# Patient Record
Sex: FEMALE | Race: WHITE | Employment: UNEMPLOYED | URBAN - METROPOLITAN AREA
[De-identification: names, ages, dates, MRNs, and addresses within clinical notes are randomized per-mention and may not be internally consistent; named-entity substitution may affect disease eponyms.]

---

## 2017-05-11 ENCOUNTER — HOSPITAL ENCOUNTER (OUTPATIENT)
Age: 62
Setting detail: SPECIMEN
Discharge: HOME | End: 2017-05-11
Payer: MEDICARE

## 2017-05-11 LAB
HCT VFR BLD CALC: 37.3 % (ref 36–46)
HEMOGLOBIN: 12 G/DL (ref 12–16)
MCH RBC QN AUTO: 25.2 PG (ref 26–34)
MCHC RBC AUTO-ENTMCNC: 32.1 G/DL (ref 31–37)
MCV RBC AUTO: 78.3 FL (ref 80–100)
PDW BLD-RTO: 15.8 % (ref 11.5–14.5)
PLATELET # BLD: 186 K/UL (ref 130–400)
PMV BLD AUTO: 8.4 FL (ref 6–12)
RBC # BLD: 4.76 M/UL (ref 4–5.2)
WBC # BLD: 8.8 K/UL (ref 3.5–11)

## 2017-05-11 PROCEDURE — 85027 COMPLETE CBC AUTOMATED: CPT

## 2017-05-11 PROCEDURE — 36415 COLL VENOUS BLD VENIPUNCTURE: CPT

## 2017-05-11 PROCEDURE — P9603 ONE-WAY ALLOW PRORATED MILES: HCPCS

## 2017-09-01 ENCOUNTER — HOSPITAL ENCOUNTER (OUTPATIENT)
Age: 62
Setting detail: SPECIMEN
Discharge: HOME | End: 2017-09-01
Payer: COMMERCIAL

## 2017-09-01 LAB
ANION GAP SERPL CALCULATED.3IONS-SCNC: 11 MMOL/L (ref 9–17)
BUN BLDV-MCNC: 17 MG/DL (ref 8–23)
BUN/CREAT BLD: 28 (ref 9–20)
CALCIUM SERPL-MCNC: 9.5 MG/DL (ref 8.6–10.4)
CHLORIDE BLD-SCNC: 99 MMOL/L (ref 98–107)
CO2: 30 MMOL/L (ref 20–31)
CREAT SERPL-MCNC: 0.61 MG/DL (ref 0.5–0.9)
GFR AFRICAN AMERICAN: >60 ML/MIN
GFR NON-AFRICAN AMERICAN: >60 ML/MIN
GFR SERPL CREATININE-BSD FRML MDRD: ABNORMAL ML/MIN/{1.73_M2}
GFR SERPL CREATININE-BSD FRML MDRD: ABNORMAL ML/MIN/{1.73_M2}
GLUCOSE BLD-MCNC: 85 MG/DL (ref 70–99)
POTASSIUM SERPL-SCNC: 3.9 MMOL/L (ref 3.7–5.3)
SODIUM BLD-SCNC: 140 MMOL/L (ref 135–144)

## 2017-09-01 PROCEDURE — 36415 COLL VENOUS BLD VENIPUNCTURE: CPT

## 2017-09-01 PROCEDURE — P9603 ONE-WAY ALLOW PRORATED MILES: HCPCS

## 2017-09-01 PROCEDURE — 80048 BASIC METABOLIC PNL TOTAL CA: CPT

## 2017-09-13 ENCOUNTER — HOSPITAL ENCOUNTER (OUTPATIENT)
Age: 62
Setting detail: SPECIMEN
Discharge: HOME | End: 2017-09-13
Payer: COMMERCIAL

## 2017-09-13 LAB
ALBUMIN SERPL-MCNC: 3.9 G/DL (ref 3.5–5.2)
ALBUMIN/GLOBULIN RATIO: ABNORMAL (ref 1–2.5)
ALP BLD-CCNC: 114 U/L (ref 35–104)
ALT SERPL-CCNC: 16 U/L (ref 5–33)
AST SERPL-CCNC: 18 U/L
BILIRUB SERPL-MCNC: 0.27 MG/DL (ref 0.3–1.2)
BILIRUBIN DIRECT: <0.08 MG/DL
BILIRUBIN, INDIRECT: ABNORMAL MG/DL (ref 0–1)
CHOLESTEROL/HDL RATIO: 4.2
CHOLESTEROL: 175 MG/DL
GLOBULIN: ABNORMAL G/DL (ref 1.5–3.8)
HDLC SERPL-MCNC: 42 MG/DL
LDL CHOLESTEROL: 107 MG/DL (ref 0–130)
TOTAL PROTEIN: 7.4 G/DL (ref 6.4–8.3)
TRIGL SERPL-MCNC: 131 MG/DL
VLDLC SERPL CALC-MCNC: NORMAL MG/DL (ref 1–30)

## 2017-09-13 PROCEDURE — 80076 HEPATIC FUNCTION PANEL: CPT

## 2017-09-13 PROCEDURE — 36415 COLL VENOUS BLD VENIPUNCTURE: CPT

## 2017-09-13 PROCEDURE — 80061 LIPID PANEL: CPT

## 2017-09-13 PROCEDURE — P9603 ONE-WAY ALLOW PRORATED MILES: HCPCS

## 2017-10-12 ENCOUNTER — HOSPITAL ENCOUNTER (OUTPATIENT)
Age: 62
Setting detail: SPECIMEN
Discharge: HOME | End: 2017-10-12
Payer: COMMERCIAL

## 2017-10-12 LAB
T3 FREE: 2.65 PG/ML (ref 2.02–4.43)
THYROXINE, FREE: 0.85 NG/DL (ref 0.93–1.7)
TSH SERPL DL<=0.05 MIU/L-ACNC: 2.91 MIU/L (ref 0.3–5)

## 2017-10-12 PROCEDURE — 84439 ASSAY OF FREE THYROXINE: CPT

## 2017-10-12 PROCEDURE — P9603 ONE-WAY ALLOW PRORATED MILES: HCPCS

## 2017-10-12 PROCEDURE — 36415 COLL VENOUS BLD VENIPUNCTURE: CPT

## 2017-10-12 PROCEDURE — 84443 ASSAY THYROID STIM HORMONE: CPT

## 2017-10-12 PROCEDURE — 84481 FREE ASSAY (FT-3): CPT

## 2017-11-10 ENCOUNTER — HOSPITAL ENCOUNTER (OUTPATIENT)
Age: 62
Setting detail: SPECIMEN
Discharge: HOME | End: 2017-11-10
Payer: COMMERCIAL

## 2017-11-10 LAB
HCT VFR BLD CALC: 37.1 % (ref 36–46)
HEMOGLOBIN: 12 G/DL (ref 12–16)
MCH RBC QN AUTO: 25.2 PG (ref 26–34)
MCHC RBC AUTO-ENTMCNC: 32.5 G/DL (ref 31–37)
MCV RBC AUTO: 77.7 FL (ref 80–100)
PDW BLD-RTO: 17 % (ref 11.5–14.5)
PLATELET # BLD: 165 K/UL (ref 130–400)
PMV BLD AUTO: 9.6 FL (ref 6–12)
RBC # BLD: 4.77 M/UL (ref 4–5.2)
WBC # BLD: 8.5 K/UL (ref 3.5–11)

## 2017-11-10 PROCEDURE — P9603 ONE-WAY ALLOW PRORATED MILES: HCPCS

## 2017-11-10 PROCEDURE — 85027 COMPLETE CBC AUTOMATED: CPT

## 2017-11-10 PROCEDURE — 36415 COLL VENOUS BLD VENIPUNCTURE: CPT

## 2017-12-07 ENCOUNTER — HOSPITAL ENCOUNTER (OUTPATIENT)
Age: 62
Setting detail: SPECIMEN
Discharge: HOME | End: 2017-12-07
Payer: COMMERCIAL

## 2017-12-08 ENCOUNTER — HOSPITAL ENCOUNTER (OUTPATIENT)
Age: 62
Setting detail: SPECIMEN
Discharge: HOME | End: 2017-12-08
Payer: COMMERCIAL

## 2017-12-08 LAB
ALBUMIN SERPL-MCNC: 3.8 G/DL (ref 3.5–5.2)
ALBUMIN/GLOBULIN RATIO: 1.1 (ref 1–2.5)
ALP BLD-CCNC: 102 U/L (ref 35–104)
ALT SERPL-CCNC: 17 U/L (ref 5–33)
ANION GAP SERPL CALCULATED.3IONS-SCNC: 9 MMOL/L (ref 9–17)
AST SERPL-CCNC: 18 U/L
BILIRUB SERPL-MCNC: 0.23 MG/DL (ref 0.3–1.2)
BNP INTERPRETATION: NORMAL
BUN BLDV-MCNC: 19 MG/DL (ref 8–23)
BUN/CREAT BLD: ABNORMAL (ref 9–20)
CALCIUM SERPL-MCNC: 9.1 MG/DL (ref 8.6–10.4)
CHLORIDE BLD-SCNC: 97 MMOL/L (ref 98–107)
CO2: 33 MMOL/L (ref 20–31)
CORTISOL COLLECTION INFO: NORMAL
CORTISOL: 3.6 UG/DL (ref 2.7–18.4)
CREAT SERPL-MCNC: 0.57 MG/DL (ref 0.5–0.9)
ESTIMATED AVERAGE GLUCOSE: 105 MG/DL
GFR AFRICAN AMERICAN: >60 ML/MIN
GFR NON-AFRICAN AMERICAN: >60 ML/MIN
GFR SERPL CREATININE-BSD FRML MDRD: ABNORMAL ML/MIN/{1.73_M2}
GFR SERPL CREATININE-BSD FRML MDRD: ABNORMAL ML/MIN/{1.73_M2}
GLUCOSE BLD-MCNC: 108 MG/DL (ref 70–99)
HBA1C MFR BLD: 5.3 % (ref 4–6)
POTASSIUM SERPL-SCNC: 4.5 MMOL/L (ref 3.7–5.3)
PRO-BNP: 89 PG/ML
SODIUM BLD-SCNC: 139 MMOL/L (ref 135–144)
TOTAL PROTEIN: 7.2 G/DL (ref 6.4–8.3)

## 2017-12-08 PROCEDURE — P9603 ONE-WAY ALLOW PRORATED MILES: HCPCS

## 2017-12-08 PROCEDURE — 83036 HEMOGLOBIN GLYCOSYLATED A1C: CPT

## 2017-12-08 PROCEDURE — 36415 COLL VENOUS BLD VENIPUNCTURE: CPT

## 2017-12-08 PROCEDURE — 80053 COMPREHEN METABOLIC PANEL: CPT

## 2017-12-08 PROCEDURE — 83880 ASSAY OF NATRIURETIC PEPTIDE: CPT

## 2017-12-08 PROCEDURE — 82533 TOTAL CORTISOL: CPT

## 2018-01-08 ENCOUNTER — APPOINTMENT (OUTPATIENT)
Dept: CT IMAGING | Age: 63
DRG: 208 | End: 2018-01-08
Payer: COMMERCIAL

## 2018-01-08 ENCOUNTER — APPOINTMENT (OUTPATIENT)
Dept: GENERAL RADIOLOGY | Age: 63
DRG: 208 | End: 2018-01-08
Payer: COMMERCIAL

## 2018-01-08 ENCOUNTER — HOSPITAL ENCOUNTER (INPATIENT)
Age: 63
LOS: 4 days | Discharge: NURSING, REHAB OR LONG-TERM CARE | DRG: 208 | End: 2018-01-12
Attending: EMERGENCY MEDICINE | Admitting: INTERNAL MEDICINE
Payer: COMMERCIAL

## 2018-01-08 DIAGNOSIS — R09.02 HYPOXEMIA: ICD-10-CM

## 2018-01-08 DIAGNOSIS — R06.89 HYPERCAPNEMIA: ICD-10-CM

## 2018-01-08 DIAGNOSIS — J95.09 TRACHEOSTOMY OBSTRUCTION (HCC): Primary | ICD-10-CM

## 2018-01-08 DIAGNOSIS — J18.9 PNEUMONIA DUE TO ORGANISM: ICD-10-CM

## 2018-01-08 DIAGNOSIS — R06.82 TACHYPNEA: ICD-10-CM

## 2018-01-08 LAB
ABSOLUTE EOS #: 0.11 K/UL (ref 0–0.4)
ABSOLUTE IMMATURE GRANULOCYTE: ABNORMAL K/UL (ref 0–0.3)
ABSOLUTE LYMPH #: 1.25 K/UL (ref 1–4.8)
ABSOLUTE MONO #: 0.57 K/UL (ref 0.1–1.3)
ALLEN TEST: ABNORMAL
ANION GAP SERPL CALCULATED.3IONS-SCNC: 9 MMOL/L (ref 9–17)
BASOPHILS # BLD: 1 % (ref 0–2)
BASOPHILS ABSOLUTE: 0.11 K/UL (ref 0–0.2)
BNP INTERPRETATION: NORMAL
BUN BLDV-MCNC: 14 MG/DL (ref 8–23)
BUN/CREAT BLD: ABNORMAL (ref 9–20)
CALCIUM SERPL-MCNC: 9.7 MG/DL (ref 8.6–10.4)
CARBOXYHEMOGLOBIN: 1.4 % (ref 0–5)
CHLORIDE BLD-SCNC: 96 MMOL/L (ref 98–107)
CO2: 38 MMOL/L (ref 20–31)
CREAT SERPL-MCNC: 0.5 MG/DL (ref 0.5–0.9)
DIFFERENTIAL TYPE: ABNORMAL
DIRECT EXAM: NORMAL
EKG ATRIAL RATE: 124 BPM
EKG P AXIS: 59 DEGREES
EKG P-R INTERVAL: 156 MS
EKG Q-T INTERVAL: 308 MS
EKG QRS DURATION: 80 MS
EKG QTC CALCULATION (BAZETT): 442 MS
EKG R AXIS: 13 DEGREES
EKG T AXIS: 133 DEGREES
EKG VENTRICULAR RATE: 124 BPM
EOSINOPHILS RELATIVE PERCENT: 1 % (ref 0–4)
FIO2: ABNORMAL
GFR AFRICAN AMERICAN: >60 ML/MIN
GFR NON-AFRICAN AMERICAN: >60 ML/MIN
GFR SERPL CREATININE-BSD FRML MDRD: ABNORMAL ML/MIN/{1.73_M2}
GFR SERPL CREATININE-BSD FRML MDRD: ABNORMAL ML/MIN/{1.73_M2}
GLUCOSE BLD-MCNC: 138 MG/DL (ref 70–99)
HCO3 ARTERIAL: 42.5 MMOL/L (ref 22–26)
HCT VFR BLD CALC: 38.6 % (ref 36–46)
HEMOGLOBIN: 12.5 G/DL (ref 12–16)
IMMATURE GRANULOCYTES: ABNORMAL %
LACTIC ACID: 0.7 MMOL/L (ref 0.5–2.2)
LYMPHOCYTES # BLD: 11 % (ref 24–44)
Lab: NORMAL
MCH RBC QN AUTO: 25.2 PG (ref 26–34)
MCHC RBC AUTO-ENTMCNC: 32.2 G/DL (ref 31–37)
MCV RBC AUTO: 78.4 FL (ref 80–100)
METHEMOGLOBIN: 0.4 % (ref 0–1.9)
MODE: ABNORMAL
MONOCYTES # BLD: 5 % (ref 1–7)
MORPHOLOGY: ABNORMAL
NEGATIVE BASE EXCESS, ART: ABNORMAL MMOL/L (ref 0–2)
NOTIFICATION TIME: ABNORMAL
NOTIFICATION: ABNORMAL
O2 DEVICE/FLOW/%: ABNORMAL
O2 SAT, ARTERIAL: 85.2 % (ref 95–98)
OXYHEMOGLOBIN: ABNORMAL % (ref 95–98)
PATIENT TEMP: 37
PCO2 ARTERIAL: 87.5 MMHG (ref 35–45)
PCO2, ART, TEMP ADJ: ABNORMAL (ref 35–45)
PDW BLD-RTO: 16.5 % (ref 11.5–14.9)
PEEP/CPAP: ABNORMAL
PH ARTERIAL: 7.29 (ref 7.35–7.45)
PH, ART, TEMP ADJ: ABNORMAL (ref 7.35–7.45)
PLATELET # BLD: 156 K/UL (ref 150–450)
PLATELET ESTIMATE: ABNORMAL
PMV BLD AUTO: 8.6 FL (ref 6–12)
PO2 ARTERIAL: 54.7 MMHG (ref 80–100)
PO2, ART, TEMP ADJ: ABNORMAL MMHG (ref 80–100)
POSITIVE BASE EXCESS, ART: 16 MMOL/L (ref 0–2)
POTASSIUM SERPL-SCNC: 4.3 MMOL/L (ref 3.7–5.3)
PRO-BNP: 276 PG/ML
PSV: ABNORMAL
PT. POSITION: ABNORMAL
RBC # BLD: 4.93 M/UL (ref 4–5.2)
RBC # BLD: ABNORMAL 10*6/UL
RESPIRATORY RATE: 22
SAMPLE SITE: ABNORMAL
SEG NEUTROPHILS: 82 % (ref 36–66)
SEGMENTED NEUTROPHILS ABSOLUTE COUNT: 9.36 K/UL (ref 1.3–9.1)
SET RATE: ABNORMAL
SODIUM BLD-SCNC: 143 MMOL/L (ref 135–144)
SPECIMEN DESCRIPTION: NORMAL
SPECIMEN DESCRIPTION: NORMAL
STATUS: NORMAL
TEXT FOR RESPIRATORY: ABNORMAL
TOTAL HB: ABNORMAL G/DL (ref 12–16)
TOTAL RATE: ABNORMAL
TROPONIN INTERP: NORMAL
TROPONIN INTERP: NORMAL
TROPONIN T: <0.03 NG/ML
TROPONIN T: <0.03 NG/ML
VT: ABNORMAL
WBC # BLD: 11.4 K/UL (ref 3.5–11)
WBC # BLD: ABNORMAL 10*3/UL

## 2018-01-08 PROCEDURE — 6370000000 HC RX 637 (ALT 250 FOR IP): Performed by: EMERGENCY MEDICINE

## 2018-01-08 PROCEDURE — 82805 BLOOD GASES W/O2 SATURATION: CPT

## 2018-01-08 PROCEDURE — 6360000002 HC RX W HCPCS: Performed by: EMERGENCY MEDICINE

## 2018-01-08 PROCEDURE — 94762 N-INVAS EAR/PLS OXIMTRY CONT: CPT

## 2018-01-08 PROCEDURE — 31502 CHANGE OF WINDPIPE AIRWAY: CPT

## 2018-01-08 PROCEDURE — 86738 MYCOPLASMA ANTIBODY: CPT

## 2018-01-08 PROCEDURE — 2580000003 HC RX 258: Performed by: STUDENT IN AN ORGANIZED HEALTH CARE EDUCATION/TRAINING PROGRAM

## 2018-01-08 PROCEDURE — 31720 CLEARANCE OF AIRWAYS: CPT

## 2018-01-08 PROCEDURE — 36415 COLL VENOUS BLD VENIPUNCTURE: CPT

## 2018-01-08 PROCEDURE — 83880 ASSAY OF NATRIURETIC PEPTIDE: CPT

## 2018-01-08 PROCEDURE — 94002 VENT MGMT INPAT INIT DAY: CPT

## 2018-01-08 PROCEDURE — 2000000000 HC ICU R&B

## 2018-01-08 PROCEDURE — 36600 WITHDRAWAL OF ARTERIAL BLOOD: CPT

## 2018-01-08 PROCEDURE — 99223 1ST HOSP IP/OBS HIGH 75: CPT | Performed by: INTERNAL MEDICINE

## 2018-01-08 PROCEDURE — 85025 COMPLETE CBC W/AUTO DIFF WBC: CPT

## 2018-01-08 PROCEDURE — 2580000003 HC RX 258: Performed by: EMERGENCY MEDICINE

## 2018-01-08 PROCEDURE — 87804 INFLUENZA ASSAY W/OPTIC: CPT

## 2018-01-08 PROCEDURE — 94664 DEMO&/EVAL PT USE INHALER: CPT

## 2018-01-08 PROCEDURE — 84484 ASSAY OF TROPONIN QUANT: CPT

## 2018-01-08 PROCEDURE — 87040 BLOOD CULTURE FOR BACTERIA: CPT

## 2018-01-08 PROCEDURE — 99285 EMERGENCY DEPT VISIT HI MDM: CPT

## 2018-01-08 PROCEDURE — 71045 X-RAY EXAM CHEST 1 VIEW: CPT

## 2018-01-08 PROCEDURE — 94640 AIRWAY INHALATION TREATMENT: CPT

## 2018-01-08 PROCEDURE — 6360000004 HC RX CONTRAST MEDICATION: Performed by: EMERGENCY MEDICINE

## 2018-01-08 PROCEDURE — 5A1935Z RESPIRATORY VENTILATION, LESS THAN 24 CONSECUTIVE HOURS: ICD-10-PCS | Performed by: INTERNAL MEDICINE

## 2018-01-08 PROCEDURE — 2060000000 HC ICU INTERMEDIATE R&B

## 2018-01-08 PROCEDURE — 83605 ASSAY OF LACTIC ACID: CPT

## 2018-01-08 PROCEDURE — 80048 BASIC METABOLIC PNL TOTAL CA: CPT

## 2018-01-08 PROCEDURE — 93005 ELECTROCARDIOGRAM TRACING: CPT

## 2018-01-08 RX ORDER — SODIUM CHLORIDE 0.9 % (FLUSH) 0.9 %
10 SYRINGE (ML) INJECTION PRN
Status: DISCONTINUED | OUTPATIENT
Start: 2018-01-08 | End: 2018-01-09

## 2018-01-08 RX ORDER — ALBUTEROL SULFATE 2.5 MG/3ML
2.5 SOLUTION RESPIRATORY (INHALATION)
Status: DISCONTINUED | OUTPATIENT
Start: 2018-01-08 | End: 2018-01-12 | Stop reason: HOSPADM

## 2018-01-08 RX ORDER — IPRATROPIUM BROMIDE AND ALBUTEROL SULFATE 2.5; .5 MG/3ML; MG/3ML
1 SOLUTION RESPIRATORY (INHALATION) ONCE
Status: COMPLETED | OUTPATIENT
Start: 2018-01-08 | End: 2018-01-08

## 2018-01-08 RX ORDER — METHYLPREDNISOLONE SODIUM SUCCINATE 125 MG/2ML
125 INJECTION, POWDER, LYOPHILIZED, FOR SOLUTION INTRAMUSCULAR; INTRAVENOUS ONCE
Status: COMPLETED | OUTPATIENT
Start: 2018-01-08 | End: 2018-01-08

## 2018-01-08 RX ORDER — ATORVASTATIN CALCIUM 40 MG/1
40 TABLET, FILM COATED ORAL NIGHTLY
Status: DISCONTINUED | OUTPATIENT
Start: 2018-01-08 | End: 2018-01-12 | Stop reason: HOSPADM

## 2018-01-08 RX ORDER — FUROSEMIDE 40 MG/1
40 TABLET ORAL DAILY
Status: DISCONTINUED | OUTPATIENT
Start: 2018-01-09 | End: 2018-01-12 | Stop reason: HOSPADM

## 2018-01-08 RX ORDER — RISPERIDONE 3 MG/1
3 TABLET, FILM COATED ORAL DAILY
Status: DISCONTINUED | OUTPATIENT
Start: 2018-01-09 | End: 2018-01-09

## 2018-01-08 RX ORDER — IPRATROPIUM BROMIDE AND ALBUTEROL SULFATE 2.5; .5 MG/3ML; MG/3ML
1 SOLUTION RESPIRATORY (INHALATION) EVERY 6 HOURS
Status: ON HOLD | COMMUNITY
End: 2018-01-12 | Stop reason: HOSPADM

## 2018-01-08 RX ORDER — NITROGLYCERIN 0.4 MG/1
0.4 TABLET SUBLINGUAL EVERY 5 MIN PRN
COMMUNITY

## 2018-01-08 RX ORDER — GUAIFENESIN AND DEXTROMETHORPHAN HYDROBROMIDE 100; 10 MG/5ML; MG/5ML
5 SOLUTION ORAL EVERY 4 HOURS PRN
COMMUNITY

## 2018-01-08 RX ORDER — LOPERAMIDE HCL 1 MG/7.5ML
2 SUSPENSION ORAL EVERY 6 HOURS PRN
COMMUNITY

## 2018-01-08 RX ORDER — METHYLPREDNISOLONE SODIUM SUCCINATE 125 MG/2ML
80 INJECTION, POWDER, LYOPHILIZED, FOR SOLUTION INTRAMUSCULAR; INTRAVENOUS 3 TIMES DAILY
Status: DISCONTINUED | OUTPATIENT
Start: 2018-01-08 | End: 2018-01-10

## 2018-01-08 RX ORDER — NICOTINE 21 MG/24HR
1 PATCH, TRANSDERMAL 24 HOURS TRANSDERMAL DAILY PRN
Status: DISCONTINUED | OUTPATIENT
Start: 2018-01-08 | End: 2018-01-12 | Stop reason: HOSPADM

## 2018-01-08 RX ORDER — FAMOTIDINE 20 MG/1
20 TABLET, FILM COATED ORAL 2 TIMES DAILY
Status: DISCONTINUED | OUTPATIENT
Start: 2018-01-08 | End: 2018-01-12 | Stop reason: HOSPADM

## 2018-01-08 RX ORDER — BUDESONIDE 0.25 MG/2ML
250 INHALANT ORAL 2 TIMES DAILY
Status: DISCONTINUED | OUTPATIENT
Start: 2018-01-08 | End: 2018-01-12 | Stop reason: HOSPADM

## 2018-01-08 RX ORDER — LORAZEPAM 1 MG/1
1 TABLET ORAL EVERY 6 HOURS PRN
COMMUNITY

## 2018-01-08 RX ORDER — 0.9 % SODIUM CHLORIDE 0.9 %
1000 INTRAVENOUS SOLUTION INTRAVENOUS ONCE
Status: COMPLETED | OUTPATIENT
Start: 2018-01-08 | End: 2018-01-08

## 2018-01-08 RX ORDER — LISINOPRIL 5 MG/1
2.5 TABLET ORAL DAILY
Status: DISCONTINUED | OUTPATIENT
Start: 2018-01-09 | End: 2018-01-12 | Stop reason: HOSPADM

## 2018-01-08 RX ORDER — ACETAMINOPHEN 325 MG/1
650 TABLET ORAL EVERY 4 HOURS PRN
Status: DISCONTINUED | OUTPATIENT
Start: 2018-01-08 | End: 2018-01-12 | Stop reason: HOSPADM

## 2018-01-08 RX ORDER — LEVOFLOXACIN 5 MG/ML
500 INJECTION, SOLUTION INTRAVENOUS EVERY 24 HOURS
Status: DISCONTINUED | OUTPATIENT
Start: 2018-01-09 | End: 2018-01-12

## 2018-01-08 RX ORDER — NYSTATIN 10B UNIT
1 POWDER (EA) MISCELLANEOUS 2 TIMES DAILY
COMMUNITY

## 2018-01-08 RX ORDER — OXYBUTYNIN CHLORIDE 5 MG/1
5 TABLET ORAL DAILY
Status: DISCONTINUED | OUTPATIENT
Start: 2018-01-09 | End: 2018-01-12 | Stop reason: HOSPADM

## 2018-01-08 RX ORDER — CIPROFLOXACIN 2 MG/ML
400 INJECTION, SOLUTION INTRAVENOUS ONCE
Status: COMPLETED | OUTPATIENT
Start: 2018-01-08 | End: 2018-01-08

## 2018-01-08 RX ORDER — IPRATROPIUM BROMIDE AND ALBUTEROL SULFATE 2.5; .5 MG/3ML; MG/3ML
1 SOLUTION RESPIRATORY (INHALATION)
Status: DISCONTINUED | OUTPATIENT
Start: 2018-01-09 | End: 2018-01-12 | Stop reason: HOSPADM

## 2018-01-08 RX ORDER — 0.9 % SODIUM CHLORIDE 0.9 %
100 INTRAVENOUS SOLUTION INTRAVENOUS ONCE
Status: COMPLETED | OUTPATIENT
Start: 2018-01-08 | End: 2018-01-08

## 2018-01-08 RX ORDER — SERTRALINE HYDROCHLORIDE 100 MG/1
150 TABLET, FILM COATED ORAL NIGHTLY
COMMUNITY

## 2018-01-08 RX ORDER — FLUTICASONE PROPIONATE 50 MCG
2 SPRAY, SUSPENSION (ML) NASAL EVERY 12 HOURS
COMMUNITY

## 2018-01-08 RX ORDER — POLYETHYLENE GLYCOL 3350 17 G/17G
17 POWDER, FOR SOLUTION ORAL DAILY PRN
COMMUNITY

## 2018-01-08 RX ORDER — SODIUM CHLORIDE 0.9 % (FLUSH) 0.9 %
10 SYRINGE (ML) INJECTION PRN
Status: DISCONTINUED | OUTPATIENT
Start: 2018-01-08 | End: 2018-01-12 | Stop reason: HOSPADM

## 2018-01-08 RX ORDER — ONDANSETRON 2 MG/ML
4 INJECTION INTRAMUSCULAR; INTRAVENOUS EVERY 6 HOURS PRN
Status: DISCONTINUED | OUTPATIENT
Start: 2018-01-08 | End: 2018-01-12 | Stop reason: HOSPADM

## 2018-01-08 RX ORDER — ACETAMINOPHEN 325 MG/1
650 TABLET ORAL EVERY 4 HOURS PRN
COMMUNITY

## 2018-01-08 RX ORDER — SODIUM CHLORIDE 0.9 % (FLUSH) 0.9 %
10 SYRINGE (ML) INJECTION EVERY 12 HOURS SCHEDULED
Status: DISCONTINUED | OUTPATIENT
Start: 2018-01-08 | End: 2018-01-12 | Stop reason: HOSPADM

## 2018-01-08 RX ORDER — SODIUM CHLORIDE 9 MG/ML
INJECTION, SOLUTION INTRAVENOUS CONTINUOUS
Status: DISCONTINUED | OUTPATIENT
Start: 2018-01-08 | End: 2018-01-12 | Stop reason: HOSPADM

## 2018-01-08 RX ADMIN — METHYLPREDNISOLONE SODIUM SUCCINATE 125 MG: 125 INJECTION, POWDER, FOR SOLUTION INTRAMUSCULAR; INTRAVENOUS at 18:55

## 2018-01-08 RX ADMIN — IOPAMIDOL 100 ML: 755 INJECTION, SOLUTION INTRAVENOUS at 17:16

## 2018-01-08 RX ADMIN — CIPROFLOXACIN 400 MG: 2 INJECTION, SOLUTION INTRAVENOUS at 18:55

## 2018-01-08 RX ADMIN — IPRATROPIUM BROMIDE AND ALBUTEROL SULFATE 1 AMPULE: .5; 3 SOLUTION RESPIRATORY (INHALATION) at 14:50

## 2018-01-08 RX ADMIN — Medication 10 ML: at 17:17

## 2018-01-08 RX ADMIN — SODIUM CHLORIDE 100 ML: 9 INJECTION, SOLUTION INTRAVENOUS at 17:16

## 2018-01-08 RX ADMIN — MEROPENEM 1 G: 1 INJECTION, POWDER, FOR SOLUTION INTRAVENOUS at 18:55

## 2018-01-08 RX ADMIN — VANCOMYCIN HYDROCHLORIDE: 10 INJECTION, POWDER, LYOPHILIZED, FOR SOLUTION INTRAVENOUS at 20:03

## 2018-01-08 RX ADMIN — SODIUM CHLORIDE: 9 INJECTION, SOLUTION INTRAVENOUS at 22:09

## 2018-01-08 RX ADMIN — SODIUM CHLORIDE 1000 ML: 9 INJECTION, SOLUTION INTRAVENOUS at 16:40

## 2018-01-08 ASSESSMENT — PAIN SCALES - GENERAL: PAINLEVEL_OUTOF10: 0

## 2018-01-08 ASSESSMENT — ENCOUNTER SYMPTOMS
SPUTUM PRODUCTION: 1
SHORTNESS OF BREATH: 1
VOMITING: 0
SORE THROAT: 0
BACK PAIN: 0
DOUBLE VISION: 0
COUGH: 1
ABDOMINAL PAIN: 0
WHEEZING: 1
BLURRED VISION: 0
NAUSEA: 0

## 2018-01-08 ASSESSMENT — PULMONARY FUNCTION TESTS: PIF_VALUE: 28

## 2018-01-08 NOTE — ED PROVIDER NOTES
Mouth/Throat: Oropharynx is clear and moist.   Eyes: Conjunctivae and EOM are normal. Pupils are equal, round, and reactive to light. Right eye exhibits no discharge. Left eye exhibits no discharge. Neck: Normal range of motion. Neck supple. No tracheal deviation present. Cardiovascular: Regular rhythm, normal heart sounds and intact distal pulses. Tachycardia present. Exam reveals no gallop and no friction rub. No murmur heard. Pulmonary/Chest: Effort normal and breath sounds normal. No respiratory distress. She has no wheezes. She has no rales. She exhibits no tenderness. Abdominal: Soft. Bowel sounds are normal. She exhibits no distension and no mass. There is no tenderness. There is no rebound and no guarding. Musculoskeletal: Normal range of motion. She exhibits no edema or tenderness. Neurological: She is alert and oriented to person, place, and time. She has normal reflexes. No cranial nerve deficit. Skin: No rash noted. She is not diaphoretic. Psychiatric: She has a normal mood and affect. Her behavior is normal. Thought content normal.       DIAGNOSTIC RESULTS     EKG: All EKG's are interpreted by the Emergency Department Physician who either signs or Co-signs this chart in the absence of a cardiologist.    EKG Interpretation    Interpreted by emergency department physician    Rhythm: sinus tachycardia  Rate: tachycardia, 124  Axis: normal  Ectopy: none  Conduction: normal  ST Segments: no acute change  T Waves: no acute change  Q Waves: nonspecific    EKG  Impression: non-specific EKG and sinus tachycardia Similar morphology to 09-DEC-2013 07:30:45        RADIOLOGY:   I directly visualized the following  images and reviewed the radiologist interpretations:  XR CHEST PORTABLE   Final Result   Tracheostomy tube has been changed as per history. No change in bilateral   patchy infiltrates.          XR CHEST PORTABLE   Final Result   Patchy airspace and interstitial infiltrates with some pneumonia on chest x-ray versus more chronic nodular opacities, recommending CT, we will order a CT scan. Patient has borderline elevated white cell count, Negative lactic acid, low suspicion for septic shock as blood pressure has been stable,    Notified by CT scan of the patient is not tolerating lying down for Scan. Low suspicion for pulmonary embolism, and due to evidence of infection we'll treat for healthcare acquired pneumonia with complaint of back therapy vancomycin, meropenem, ciprofloxacin as patient has an allergy. Decision to admit. Discussed case with Dr. Nneka Colin who has accepted admission. Request residents be paged and also consult to pulmonology. Discussed case with Dr. Rafat Anguiano who is aware patient's presenting symptoms, concern for pneumonia, He requests ABG. Patient's ABG shows hypercapnia as well as hypoxia. Called into room at approximately 1850 saturation patient, she is hypoxic she looked cyanotic she is struggling to breathe, the inner cannula was replaced and patient  Responded to being bagged, breath sounds were checked and they were weak bilaterally, no tracheal deviation low suspicion for tension pneumothorax. Patient improves with Bagging however was very difficult, raising concern for Trach obstruction. Oxygen saturations ranged in the 70s and 80s. Anesthesia paged for additional assistance but in the OR right now unavailable, the fiberoptics scope was utilized, Without clear evidence of obstruction. Patient continued to desaturate and struggled, respiratory therapy at bedside along with Dr. Kaden Odom, we believe the best next course of action due to her decompensating condition is to exchange the trach.   This was done at bedside with the advisement of respiratory therapy, a similar coughed trach was utilized, the previous one was removed and the new one inserted by this examiner, which was performed without resistance, and patient was Again bagged without

## 2018-01-08 NOTE — PROGRESS NOTES
· Bronchodilator assessment   [x]    Bronchodilator Assessment        Bronchodilator assessment at level  2  BRONCHODILATOR ASSESSMENT SCORE  Score 1 2 3 4   Breath Sounds   []  Clear [x]  Mild Wheezing with good aeration []  Moderate I/E wheezing with adequate aeration []  Poor Aeration or diffuse wheezing   Respiratory Rate []  Less than 20 [x]  20-25 []  Greater than 25  []  Greater than 35    Dyspnea [x]  No SOB  [x]  SOB with minimal activity []  Speaking in partial sentences []  Acute/ At rest   Peakflow (asthma) []  80 % or greater predicted/PB  []  Unable []  70% or greater predicted/PB  []  Unable []  51%-70% predicted/PB  []  Unable []  Less than 50% predicted/PB  []  Unable due to distress   FEV1 % Predicted []  Greater than 69%  []  Unable  []  Less than 50%-69%  []  Unable  []  Less than 35%-49%  []  Unable  []  Less than 35%  []  Unable due to distress

## 2018-01-08 NOTE — H&P
Medication Sig Start Date End Date Taking? Authorizing Provider   albuterol (PROVENTIL HFA;VENTOLIN HFA) 108 (90 BASE) MCG/ACT inhaler Inhale 4 puffs into the lungs every 4 hours as needed for Wheezing. 12/23/13   Joyce Montes MD   atorvastatin (LIPITOR) 40 MG tablet 1 tablet by Per NG tube route nightly. 12/23/13   Joyce Montes MD   risperiDONE (RISPERDAL) 3 MG tablet 1 tablet by Per NG tube route daily. 12/23/13   Joyce Montes MD   lisinopril (PRINIVIL;ZESTRIL) 2.5 MG tablet 1 tablet by Per NG tube route daily. 12/23/13   Joyce Montes MD   nystatin (MYCOSTATIN) powder Apply to affected areas 12/23/13   Joyce Montes MD   furosemide (LASIX) 40 MG tablet 1 tablet by Per NG tube route daily. 12/23/13   Joyce Montes MD   oxybutynin (DITROPAN) 5 MG tablet 1 tablet by PEG Tube route daily. 12/23/13   Joyce Montes MD   famotidine (PEPCID) 20 MG tablet 1 tablet by PEG Tube route 2 times daily. 12/23/13   Joyce Montes MD   metoprolol (LOPRESSOR) 25 MG tablet 1 tablet by PEG Tube route 2 times daily. 12/23/13   Joyce Montes MD        Allergies:     Mobic [meloxicam] and Pcn [penicillins]    Social History:     Tobacco:    has no tobacco history on file. Alcohol:      has no alcohol history on file. Drug Use:  has no drug history on file. Family History:     History reviewed. No pertinent family history. Review of Systems:     Positive and Negative as described in HPI. Review of Systems   Constitutional: Negative for chills and fever. HENT: Negative for congestion and sore throat. Eyes: Negative for blurred vision and double vision. Respiratory: Positive for cough, sputum production, shortness of breath and wheezing. Cardiovascular: Positive for chest pain. Negative for leg swelling. Gastrointestinal: Negative for abdominal pain, nausea and vomiting. Genitourinary: Negative for dysuria, frequency and urgency. Musculoskeletal: Negative for back pain, myalgias and neck pain.    Neurological: Positive for headaches. Negative for dizziness, tingling and focal weakness. Physical Exam:   BP (!) 147/72   Pulse 108   Temp 98.1 °F (36.7 °C) (Oral)   Resp 24   Ht 5' 2\" (1.575 m)   Wt 290 lb (131.5 kg)   SpO2 93%   BMI 53.04 kg/m²   Temp (24hrs), Av.1 °F (36.7 °C), Min:98.1 °F (36.7 °C), Max:98.1 °F (36.7 °C)    No results for input(s): POCGLU in the last 72 hours. No intake or output data in the 24 hours ending 18 1853    Physical Exam   Constitutional: She is oriented to person, place, and time. She appears distressed. HENT:   Head: Normocephalic and atraumatic. Eyes: EOM are normal. Pupils are equal, round, and reactive to light. Neck: Normal range of motion. Neck supple. Cardiovascular: Normal rate, regular rhythm and normal heart sounds. Exam reveals no gallop and no friction rub. No murmur heard. Pulmonary/Chest: She is in respiratory distress. She has wheezes. She has no rales. She exhibits no tenderness. Abdominal: Soft. Bowel sounds are normal. She exhibits no distension. There is tenderness. There is no rebound and no guarding. Umbilical hernia    Musculoskeletal: She exhibits no tenderness or deformity. Neurological: She is alert and oriented to person, place, and time. Skin: Skin is warm and dry.      Investigations:      Laboratory Testing:  Recent Results (from the past 24 hour(s))   CBC Auto Differential    Collection Time: 18  3:59 PM   Result Value Ref Range    WBC 11.4 (H) 3.5 - 11.0 k/uL    RBC 4.93 4.0 - 5.2 m/uL    Hemoglobin 12.5 12.0 - 16.0 g/dL    Hematocrit 38.6 36 - 46 %    MCV 78.4 (L) 80 - 100 fL    MCH 25.2 (L) 26 - 34 pg    MCHC 32.2 31 - 37 g/dL    RDW 16.5 (H) 11.5 - 14.9 %    Platelets 126 952 - 872 k/uL    MPV 8.6 6.0 - 12.0 fL    Differential Type NOT REPORTED     Immature Granulocytes NOT REPORTED 0 %    Absolute Immature Granulocyte NOT REPORTED 0.00 - 0.30 k/uL    WBC Morphology NOT REPORTED     RBC Morphology NOT REPORTED     Platelet Estimate NOT REPORTED     Seg Neutrophils 82 (H) 36 - 66 %    Lymphocytes 11 (L) 24 - 44 %    Monocytes 5 1 - 7 %    Eosinophils % 1 0 - 4 %    Basophils 1 0 - 2 %    Segs Absolute 9.36 (H) 1.3 - 9.1 k/uL    Absolute Lymph # 1.25 1.0 - 4.8 k/uL    Absolute Mono # 0.57 0.1 - 1.3 k/uL    Absolute Eos # 0.11 0.0 - 0.4 k/uL    Basophils # 0.11 0.0 - 0.2 k/uL    Morphology ANISOCYTOSIS PRESENT    Basic Metabolic Panel    Collection Time: 01/08/18  3:59 PM   Result Value Ref Range    Glucose 138 (H) 70 - 99 mg/dL    BUN 14 8 - 23 mg/dL    CREATININE 0.50 0.50 - 0.90 mg/dL    Bun/Cre Ratio NOT REPORTED 9 - 20    Calcium 9.7 8.6 - 10.4 mg/dL    Sodium 143 135 - 144 mmol/L    Potassium 4.3 3.7 - 5.3 mmol/L    Chloride 96 (L) 98 - 107 mmol/L    CO2 38 (H) 20 - 31 mmol/L    Anion Gap 9 9 - 17 mmol/L    GFR Non-African American >60 >60 mL/min    GFR African American >60 >60 mL/min    GFR Comment          GFR Staging NOT REPORTED    Lactic Acid    Collection Time: 01/08/18  3:59 PM   Result Value Ref Range    Lactic Acid 0.7 0.5 - 2.2 mmol/L   Troponin    Collection Time: 01/08/18  3:59 PM   Result Value Ref Range    Troponin T <0.03 <0.03 ng/mL    Troponin Interp         Brain Natriuretic Peptide    Collection Time: 01/08/18  3:59 PM   Result Value Ref Range    Pro- <300 pg/mL    BNP Interpretation         Rapid influenza A/B antigens    Collection Time: 01/08/18  4:03 PM   Result Value Ref Range    Specimen Description       . NARES Performed at [facility]: IBETH BRUNER [address] [place] [address]    Specimen Description  [id number] [phone]     Special Requests NOT REPORTED     Direct Exam PRESUMPTIVE NEGATIVE for Influenza A + B antigens. Direct Exam       PCR testing to confirm this result is available upon request.  Specimen will be    Direct Exam        saved in the laboratory for 7 days. Please call [phone] if PCR testing is    Direct Exam  indicated.      Status FINAL 01/08/2018    EKG 12 Lead

## 2018-01-09 ENCOUNTER — APPOINTMENT (OUTPATIENT)
Dept: GENERAL RADIOLOGY | Age: 63
DRG: 208 | End: 2018-01-09
Payer: COMMERCIAL

## 2018-01-09 LAB
ALBUMIN SERPL-MCNC: 3.8 G/DL (ref 3.5–5.2)
ALBUMIN/GLOBULIN RATIO: ABNORMAL (ref 1–2.5)
ALLEN TEST: ABNORMAL
ALLEN TEST: ABNORMAL
ALP BLD-CCNC: 76 U/L (ref 35–104)
ALT SERPL-CCNC: 15 U/L (ref 5–33)
ANION GAP SERPL CALCULATED.3IONS-SCNC: 11 MMOL/L (ref 9–17)
AST SERPL-CCNC: 16 U/L
BILIRUB SERPL-MCNC: 0.37 MG/DL (ref 0.3–1.2)
BUN BLDV-MCNC: 15 MG/DL (ref 8–23)
BUN/CREAT BLD: ABNORMAL (ref 9–20)
CALCIUM SERPL-MCNC: 9.5 MG/DL (ref 8.6–10.4)
CARBOXYHEMOGLOBIN: 0.7 %
CARBOXYHEMOGLOBIN: 1 %
CHLORIDE BLD-SCNC: 97 MMOL/L (ref 98–107)
CO2: 34 MMOL/L (ref 20–31)
CREAT SERPL-MCNC: 0.51 MG/DL (ref 0.5–0.9)
D-DIMER QUANTITATIVE: 0.21 MG/L FEU
FIO2: 60
FIO2: ABNORMAL
GFR AFRICAN AMERICAN: >60 ML/MIN
GFR NON-AFRICAN AMERICAN: >60 ML/MIN
GFR SERPL CREATININE-BSD FRML MDRD: ABNORMAL ML/MIN/{1.73_M2}
GFR SERPL CREATININE-BSD FRML MDRD: ABNORMAL ML/MIN/{1.73_M2}
GLUCOSE BLD-MCNC: 160 MG/DL (ref 70–99)
HCO3 ARTERIAL: 37.7 MMOL/L
HCO3 ARTERIAL: 39.1 MMOL/L
HCT VFR BLD CALC: 39.1 % (ref 36–46)
HEMOGLOBIN: 12.2 G/DL (ref 12–16)
LV EF: 55 %
LVEF MODALITY: NORMAL
MCH RBC QN AUTO: 24.7 PG (ref 26–34)
MCHC RBC AUTO-ENTMCNC: 31.1 G/DL (ref 31–37)
MCV RBC AUTO: 79.2 FL (ref 80–100)
METHEMOGLOBIN: 0.4 %
METHEMOGLOBIN: 0.5 %
MODE: ABNORMAL
MODE: ABNORMAL
NEGATIVE BASE EXCESS, ART: ABNORMAL MMOL/L (ref 0–2)
NEGATIVE BASE EXCESS, ART: ABNORMAL MMOL/L (ref 0–2)
NOTIFICATION TIME: ABNORMAL
NOTIFICATION TIME: ABNORMAL
NOTIFICATION: ABNORMAL
NOTIFICATION: ABNORMAL
O2 DEVICE/FLOW/%: ABNORMAL
O2 DEVICE/FLOW/%: ABNORMAL
O2 SAT, ARTERIAL: 94.3 %
O2 SAT, ARTERIAL: 98.4 %
OXYHEMOGLOBIN: ABNORMAL % (ref 95–98)
OXYHEMOGLOBIN: ABNORMAL % (ref 95–98)
PATIENT TEMP: 37
PATIENT TEMP: 37
PCO2 ARTERIAL: 63.6 MMHG
PCO2 ARTERIAL: 69.7 MMHG
PCO2, ART, TEMP ADJ: ABNORMAL
PCO2, ART, TEMP ADJ: ABNORMAL
PDW BLD-RTO: 16.3 % (ref 11.5–14.9)
PEEP/CPAP: 5
PEEP/CPAP: 5
PH ARTERIAL: 7.36
PH ARTERIAL: 7.38
PH, ART, TEMP ADJ: ABNORMAL
PH, ART, TEMP ADJ: ABNORMAL
PLATELET # BLD: 147 K/UL (ref 150–450)
PMV BLD AUTO: 8.1 FL (ref 6–12)
PO2 ARTERIAL: 128 MMHG
PO2 ARTERIAL: 72 MMHG
PO2, ART, TEMP ADJ: ABNORMAL MMHG
PO2, ART, TEMP ADJ: ABNORMAL MMHG
POSITIVE BASE EXCESS, ART: 12.6 MMOL/L (ref 0–2)
POSITIVE BASE EXCESS, ART: 13.6 MMOL/L (ref 0–2)
POTASSIUM SERPL-SCNC: 4.4 MMOL/L (ref 3.7–5.3)
PROCALCITONIN: 0.05 NG/ML
PSV: ABNORMAL
PSV: ABNORMAL
PT. POSITION: ABNORMAL
PT. POSITION: ABNORMAL
RBC # BLD: 4.94 M/UL (ref 4–5.2)
RESPIRATORY RATE: 16
RESPIRATORY RATE: ABNORMAL
SAMPLE SITE: ABNORMAL
SAMPLE SITE: ABNORMAL
SET RATE: 16
SET RATE: 16
SODIUM BLD-SCNC: 142 MMOL/L (ref 135–144)
TEXT FOR RESPIRATORY: ABNORMAL
TEXT FOR RESPIRATORY: ABNORMAL
TOTAL HB: ABNORMAL G/DL (ref 12–16)
TOTAL HB: ABNORMAL G/DL (ref 12–16)
TOTAL PROTEIN: 8 G/DL (ref 6.4–8.3)
TOTAL RATE: 16
TOTAL RATE: 20
TROPONIN INTERP: NORMAL
TROPONIN T: <0.03 NG/ML
VANCOMYCIN TROUGH DATE LAST DOSE: NORMAL
VANCOMYCIN TROUGH DOSE AMOUNT: 1250
VANCOMYCIN TROUGH TIME LAST DOSE: 1246
VANCOMYCIN TROUGH: 12.2 UG/ML (ref 10–20)
VT: 450
VT: 450
WBC # BLD: 12.7 K/UL (ref 3.5–11)

## 2018-01-09 PROCEDURE — 82805 BLOOD GASES W/O2 SATURATION: CPT

## 2018-01-09 PROCEDURE — 94762 N-INVAS EAR/PLS OXIMTRY CONT: CPT

## 2018-01-09 PROCEDURE — 84484 ASSAY OF TROPONIN QUANT: CPT

## 2018-01-09 PROCEDURE — 94003 VENT MGMT INPAT SUBQ DAY: CPT

## 2018-01-09 PROCEDURE — 71045 X-RAY EXAM CHEST 1 VIEW: CPT

## 2018-01-09 PROCEDURE — 85379 FIBRIN DEGRADATION QUANT: CPT

## 2018-01-09 PROCEDURE — 36415 COLL VENOUS BLD VENIPUNCTURE: CPT

## 2018-01-09 PROCEDURE — 2060000000 HC ICU INTERMEDIATE R&B

## 2018-01-09 PROCEDURE — 2580000003 HC RX 258: Performed by: EMERGENCY MEDICINE

## 2018-01-09 PROCEDURE — 6370000000 HC RX 637 (ALT 250 FOR IP): Performed by: STUDENT IN AN ORGANIZED HEALTH CARE EDUCATION/TRAINING PROGRAM

## 2018-01-09 PROCEDURE — 6360000002 HC RX W HCPCS: Performed by: EMERGENCY MEDICINE

## 2018-01-09 PROCEDURE — 80053 COMPREHEN METABOLIC PANEL: CPT

## 2018-01-09 PROCEDURE — 6360000002 HC RX W HCPCS: Performed by: STUDENT IN AN ORGANIZED HEALTH CARE EDUCATION/TRAINING PROGRAM

## 2018-01-09 PROCEDURE — 2500000003 HC RX 250 WO HCPCS: Performed by: STUDENT IN AN ORGANIZED HEALTH CARE EDUCATION/TRAINING PROGRAM

## 2018-01-09 PROCEDURE — C8929 TTE W OR WO FOL WCON,DOPPLER: HCPCS

## 2018-01-09 PROCEDURE — 36600 WITHDRAWAL OF ARTERIAL BLOOD: CPT

## 2018-01-09 PROCEDURE — 80202 ASSAY OF VANCOMYCIN: CPT

## 2018-01-09 PROCEDURE — 87205 SMEAR GRAM STAIN: CPT

## 2018-01-09 PROCEDURE — 99233 SBSQ HOSP IP/OBS HIGH 50: CPT | Performed by: INTERNAL MEDICINE

## 2018-01-09 PROCEDURE — 87070 CULTURE OTHR SPECIMN AEROBIC: CPT

## 2018-01-09 PROCEDURE — 6360000004 HC RX CONTRAST MEDICATION: Performed by: FAMILY MEDICINE

## 2018-01-09 PROCEDURE — 94640 AIRWAY INHALATION TREATMENT: CPT

## 2018-01-09 PROCEDURE — 85027 COMPLETE CBC AUTOMATED: CPT

## 2018-01-09 PROCEDURE — 84145 PROCALCITONIN (PCT): CPT

## 2018-01-09 PROCEDURE — 6370000000 HC RX 637 (ALT 250 FOR IP): Performed by: NURSE PRACTITIONER

## 2018-01-09 PROCEDURE — 2580000003 HC RX 258: Performed by: STUDENT IN AN ORGANIZED HEALTH CARE EDUCATION/TRAINING PROGRAM

## 2018-01-09 PROCEDURE — 6370000000 HC RX 637 (ALT 250 FOR IP): Performed by: PSYCHIATRY & NEUROLOGY

## 2018-01-09 RX ORDER — IPRATROPIUM BROMIDE AND ALBUTEROL SULFATE 2.5; .5 MG/3ML; MG/3ML
1 SOLUTION RESPIRATORY (INHALATION) EVERY 6 HOURS PRN
COMMUNITY

## 2018-01-09 RX ORDER — LORAZEPAM 1 MG/1
1 TABLET ORAL EVERY 6 HOURS PRN
Status: DISCONTINUED | OUTPATIENT
Start: 2018-01-09 | End: 2018-01-12 | Stop reason: HOSPADM

## 2018-01-09 RX ORDER — ARIPIPRAZOLE 10 MG/1
10 TABLET ORAL DAILY
Status: DISCONTINUED | OUTPATIENT
Start: 2018-01-09 | End: 2018-01-12 | Stop reason: HOSPADM

## 2018-01-09 RX ADMIN — Medication 10 ML: at 10:31

## 2018-01-09 RX ADMIN — VANCOMYCIN HYDROCHLORIDE 1500 MG: 1 INJECTION, POWDER, LYOPHILIZED, FOR SOLUTION INTRAVENOUS at 23:12

## 2018-01-09 RX ADMIN — MICONAZOLE NITRATE: 20.6 POWDER TOPICAL at 00:24

## 2018-01-09 RX ADMIN — Medication 10 ML: at 00:23

## 2018-01-09 RX ADMIN — MEROPENEM 1 G: 1 INJECTION, POWDER, FOR SOLUTION INTRAVENOUS at 10:31

## 2018-01-09 RX ADMIN — MICONAZOLE NITRATE: 20.6 POWDER TOPICAL at 21:44

## 2018-01-09 RX ADMIN — IPRATROPIUM BROMIDE AND ALBUTEROL SULFATE 1 AMPULE: .5; 3 SOLUTION RESPIRATORY (INHALATION) at 11:45

## 2018-01-09 RX ADMIN — ARIPIPRAZOLE 10 MG: 10 TABLET ORAL at 17:10

## 2018-01-09 RX ADMIN — METHYLPREDNISOLONE SODIUM SUCCINATE 80 MG: 125 INJECTION, POWDER, FOR SOLUTION INTRAMUSCULAR; INTRAVENOUS at 00:16

## 2018-01-09 RX ADMIN — LORAZEPAM 1 MG: 1 TABLET ORAL at 10:30

## 2018-01-09 RX ADMIN — IPRATROPIUM BROMIDE AND ALBUTEROL SULFATE 1 AMPULE: .5; 3 SOLUTION RESPIRATORY (INHALATION) at 08:20

## 2018-01-09 RX ADMIN — ATORVASTATIN CALCIUM 40 MG: 40 TABLET, FILM COATED ORAL at 21:41

## 2018-01-09 RX ADMIN — MICONAZOLE NITRATE: 20.6 POWDER TOPICAL at 10:32

## 2018-01-09 RX ADMIN — LEVOFLOXACIN 500 MG: 5 INJECTION, SOLUTION INTRAVENOUS at 00:24

## 2018-01-09 RX ADMIN — MEROPENEM 1 G: 1 INJECTION, POWDER, FOR SOLUTION INTRAVENOUS at 18:14

## 2018-01-09 RX ADMIN — ENOXAPARIN SODIUM 135 MG: 150 INJECTION SUBCUTANEOUS at 00:50

## 2018-01-09 RX ADMIN — FUROSEMIDE 40 MG: 40 TABLET ORAL at 10:30

## 2018-01-09 RX ADMIN — METHYLPREDNISOLONE SODIUM SUCCINATE 80 MG: 125 INJECTION, POWDER, FOR SOLUTION INTRAMUSCULAR; INTRAVENOUS at 04:25

## 2018-01-09 RX ADMIN — LORAZEPAM 1 MG: 1 TABLET ORAL at 17:10

## 2018-01-09 RX ADMIN — LORAZEPAM 1 MG: 1 TABLET ORAL at 23:06

## 2018-01-09 RX ADMIN — LORAZEPAM 1 MG: 1 TABLET ORAL at 03:11

## 2018-01-09 RX ADMIN — IPRATROPIUM BROMIDE AND ALBUTEROL SULFATE 1 AMPULE: .5; 3 SOLUTION RESPIRATORY (INHALATION) at 16:05

## 2018-01-09 RX ADMIN — PERFLUTREN 4 ML: 6.52 INJECTION, SUSPENSION INTRAVENOUS at 10:29

## 2018-01-09 RX ADMIN — VANCOMYCIN HYDROCHLORIDE: 10 INJECTION, POWDER, LYOPHILIZED, FOR SOLUTION INTRAVENOUS at 04:26

## 2018-01-09 RX ADMIN — Medication 10 ML: at 21:44

## 2018-01-09 RX ADMIN — VANCOMYCIN HYDROCHLORIDE: 10 INJECTION, POWDER, LYOPHILIZED, FOR SOLUTION INTRAVENOUS at 12:46

## 2018-01-09 RX ADMIN — IPRATROPIUM BROMIDE AND ALBUTEROL SULFATE 1 AMPULE: .5; 3 SOLUTION RESPIRATORY (INHALATION) at 20:01

## 2018-01-09 RX ADMIN — FAMOTIDINE 20 MG: 20 TABLET, FILM COATED ORAL at 00:26

## 2018-01-09 RX ADMIN — METHYLPREDNISOLONE SODIUM SUCCINATE 80 MG: 125 INJECTION, POWDER, FOR SOLUTION INTRAMUSCULAR; INTRAVENOUS at 10:31

## 2018-01-09 RX ADMIN — BUDESONIDE 250 MCG: 0.25 SUSPENSION RESPIRATORY (INHALATION) at 20:01

## 2018-01-09 RX ADMIN — METHYLPREDNISOLONE SODIUM SUCCINATE 80 MG: 125 INJECTION, POWDER, FOR SOLUTION INTRAMUSCULAR; INTRAVENOUS at 18:13

## 2018-01-09 RX ADMIN — MEROPENEM 1 G: 1 INJECTION, POWDER, FOR SOLUTION INTRAVENOUS at 03:11

## 2018-01-09 ASSESSMENT — PULMONARY FUNCTION TESTS
PIF_VALUE: 17
PIF_VALUE: 18
PIF_VALUE: 28
PIF_VALUE: 18
PIF_VALUE: 23
PIF_VALUE: 27

## 2018-01-09 ASSESSMENT — ENCOUNTER SYMPTOMS
COUGH: 1
ABDOMINAL PAIN: 0
VOMITING: 0
WHEEZING: 0
SORE THROAT: 0
SHORTNESS OF BREATH: 1
NAUSEA: 0
DIARRHEA: 0
BACK PAIN: 0
CONSTIPATION: 0

## 2018-01-09 ASSESSMENT — PAIN SCALES - GENERAL: PAINLEVEL_OUTOF10: 0

## 2018-01-09 NOTE — PLAN OF CARE
Problem: Falls - Risk of  Goal: Absence of falls  Outcome: Ongoing  Pt resting comfortable in bed at this time. Pt remains anxious at timesPt remains free from falls this shift. Bed in low position, call light in reach, side rails up x 2, Cont to monior closely No distress noted. Assessment remains as charted. Problem: Infection - Ventilator-Associated Pneumonia:  Goal: Prevent a ventilator associated event (VAE)  Prevent a ventilator associated event (VAE)   Outcome: Ongoing  Pt remains on vent. Cont to assess resp status. Suction as needed.  Give meds as ordered Cont to monitor pulse ox and ETCO2  Goal: Absence of pulmonary infection  Absence of pulmonary infection   Outcome: Ongoing      Problem: Gas Exchange - Impaired:  Goal: Levels of oxygenation will improve  Levels of oxygenation will improve   Outcome: Ongoing

## 2018-01-09 NOTE — PROGRESS NOTES
Physical Therapy  DATE: 2018    NAME: Halie Neumann  MRN: [id number]   : 1955    Patient not seen this date for Physical Therapy due to:  [] Blood transfusion in progress  [] Hemodialysis  []  Patient Declined  [] Spine Precautions   [] Strict Bedrest  [] Surgery/ Procedure  [] Testing      [x] Other Held per RN due to pt on weaning trial       [] PT being discontinued at this time. Patient independent. No further needs. [] PT being discontinued at this time as the patient has been transferred to palliative care. No further needs.     Marcin Adkins, PT

## 2018-01-09 NOTE — PROGRESS NOTES
Pharmacy Note  Vancomycin Consult    Emmie Perez is a 65 y.o. female started on Vancomycin for pneumonia; consult received from Dr. Janna Fernandez to manage therapy. Also receiving the following antibiotics: merrem. Patient Active Problem List   Diagnosis    Lower urinary tract infectious disease    [facility]    NSTEMI (non-ST elevated myocardial infarction) ([facility]    Cellulitis of leg, left    CHF (congestive heart failure) ([facility] [facility]    Metabolic Alkalosis secondary chronic respiratory failure    Pneumonia       Allergies:  Mobic [meloxicam] and Pcn [penicillins]     Temp max: 36.7    Recent Labs      01/08/18   1559   BUN  14       Recent Labs      01/08/18   1559   CREATININE  0.50       Recent Labs      01/08/18   1559   WBC  11.4*       No intake or output data in the 24 hours ending 01/08/18 2116      Ht Readings from Last 1 Encounters:   01/08/18 5' 2\" (1.575 m)        Wt Readings from Last 1 Encounters:   01/08/18 290 lb (131.5 kg)         Body mass index is 53.04 kg/m². Estimated Creatinine Clearance: 154 mL/min (based on SCr of 0.5 mg/dL). Assessment/Plan:  Will initiate vancomycin 1520 mg IV every 8 hours. Timing of trough level will be determined based on culture results, renal function, and clinical response. Thank you for the consult. Will continue to follow.   Francesco Brandon RPh  1/8/2018  9:16 PM

## 2018-01-09 NOTE — PLAN OF CARE
Problem: Falls - Risk of  Goal: Absence of falls  Outcome: Met This Shift  Patient remained free from falls. Call light within reach. Problem: Infection - Ventilator-Associated Pneumonia:  Goal: Prevent a ventilator associated event (VAE)  Prevent a ventilator associated event (VAE)   Outcome: Met This Shift      Problem: Gas Exchange - Impaired:  Goal: Levels of oxygenation will improve  Levels of oxygenation will improve   Outcome: Met This Shift  Patient maintaining oxygen saturation in mid 90's on trach collar.

## 2018-01-09 NOTE — PROGRESS NOTES
Psychiatry consultation. Chart reviewed and patient interviewed. This is a 41-year-old white female patient who was admitted on 8 July 2018 from emergency room due to pneumonia. She was originally sent the emergency room from the nursing home because of her agitation and refusal of care. She states that she has been quite overwhelmed and agitated at the nursing home because they keep it very hard and when she lowers the temperature. nurses fight with her. She states that she has been having problems there a couple of roommates also who was yelling and screaming so she got moved to a separate room which helpless situation to some extent. She states that she has had problems with depression and agitation as well as irritability for quite some time but has been refusing to take any medications for that. She states that she has been in extended care facility now for over a year because of respiratory problems and has had a tracheostomy for over a year. On mental status examination She is of average height and obese, cooperative and informative, talking slowly in the spring due to tracheal tube . Her affect is appropriate and responds to questions spontaneously relevantly and coherently. She denies any feelings of hopelessness helplessness or suicidal feelings. She denies any homicidal feelings delusions ideas of reference or hallucinations and none were observed during the examination. She is alert and oriented to time place person and situation. Her memory for recent as well as remote events is fair. Intellectually she is average. She has superficial judgment and poor insight. Diagnostic impression  : Bipolar disorder    Will manage her  psych. Medication and provide supportive therapy as well as develop insight. Side effects of medications reviewed and medication education provided.

## 2018-01-09 NOTE — PROGRESS NOTES
[id number] [facility]    Progress Note    1/9/2018    7:57 AM    Name:   Dahiana Sousa  MRN:     [id number]     Acct:      [de-identified]   Room:   2007/2007-01  IP Day:  1  Admit Date:  1/8/2018  2:34 PM    PCP:   Zita Ellis MD  Code Status:  Full Code    Subjective:     C/C:   Chief Complaint   Patient presents with    Mental Health Problem     Interval History Status: improved. Patient seen and examined at bedside this morning. Pt was anxious overnight, Pts trach noted to be out ~2.5cm with pt refusing to let it be adjusted. Pt had no desats overnight. Breathing well this morning during vent wean trial. Pt has no complaints of CP, n/v, fever or chills overnight. Pt has occasional cough. Brief History:     The patient is a 65 y.o.   female who presents with Mental Health Problem   and she is admitted to the hospital for the management of  Pneumonia. Pt was sent from [facility] for psychiatric issues. Pt arrived with Pink Slip from facility after patient was refusing treatment. Pt has been having thick secretions, unknown color or consistency, from her trach for several days. Pt is a poor historian and has difficulty speaking due to SOB and her trach. Pt states she has been having difficulty breathing, has occasional CP, HA, rhinorrhea, diarrhea several days ago that has since resolved. Pt denies any fever, chills, urinary problems or difficulty eating/swallowing. Pt has had 3 trachs in total. The current trach has been present for the past 1 year. Pt states she has had a stoke and MI in the past, no sensory or motor deficiencies.     In the ED, pt given bolus of fluids. Given Cipro and meropenem. Treated with Duoneb and solumedrol. CXR showed multiple prominent nodular opacities primarily within the left perihilar region. ABG 7.295/87.5/54.7/42. 5.     Review of Systems:     Review of Systems   Constitutional: Negative for chills and fever. HENT: Negative for congestion and sore throat. Respiratory: Positive for cough and shortness of breath. Negative for wheezing. Cardiovascular: Negative for chest pain and palpitations. Gastrointestinal: Negative for abdominal pain, constipation, diarrhea, nausea and vomiting. Genitourinary: Negative for frequency and urgency. Musculoskeletal: Negative for back pain and neck pain. Skin: Negative for itching and rash. Neurological: Negative for dizziness and headaches. Medications: Allergies: Allergies   Allergen Reactions    Mobic [Meloxicam]     Pcn [Penicillins]     Risperidone Palpitations       Current Meds:   Scheduled Meds:    enoxaparin  1 mg/kg Subcutaneous BID    meropenem  1 g Intravenous Q8H    atorvastatin  40 mg Per NG tube Nightly    furosemide  40 mg Per NG tube Daily    lisinopril  2.5 mg Per NG tube Daily    metoprolol tartrate  25 mg PEG Tube BID    oxybutynin  5 mg PEG Tube Daily    miconazole   Topical BID    risperiDONE  3 mg Per NG tube Daily    sodium chloride flush  10 mL Intravenous 2 times per day    famotidine  20 mg Oral BID    ipratropium-albuterol  1 ampule Inhalation Q4H WA    levofloxacin  500 mg Intravenous Q24H    budesonide  250 mcg Nebulization BID    methylPREDNISolone  80 mg Intravenous TID    vancomycin (VANCOCIN) intermittent dosing (placeholder)   Other RX Placeholder    IVPB builder   Intravenous Q8H     Continuous Infusions:    sodium chloride 75 mL/hr at 01/08/18 2209     PRN Meds: LORazepam, sodium chloride flush, sodium chloride flush, acetaminophen, ondansetron, nicotine, albuterol    Data:     Past Medical History:   has a past medical history of Bipolar 1 disorder ([address] Cervicalgia; CHF (congestive heart failure) ([facility]); and MRSA (methicillin resistant staph aureus) culture positive. Social History:        Family History: History reviewed. No pertinent family history.     Vitals:  BP (!) 117/54   Pulse 74 and time. No distress. States she is hungry. HENT:   Head: Normocephalic and atraumatic. Eyes: EOM are normal. Pupils are equal, round, and reactive to light. Neck: Normal range of motion. Neck supple. Trach in place    Cardiovascular: Normal rate, regular rhythm and normal heart sounds. Exam reveals no gallop and no friction rub. No murmur heard. Pulmonary/Chest: She breathing comfortably. She has no rales. She exhibits no tenderness. Abdominal: Soft. Bowel sounds are normal. She exhibits no distension. There is mild tenderness near umbilical hernia. There is no rebound and no guarding. Musculoskeletal: She exhibits no tenderness or deformity. Neurological: She is alert and oriented to person, place, and time. Skin: Skin is warm and dry. Assessment:        Primary Problem  Pneumonia    Active Hospital Problems    Diagnosis Date Noted    Pneumonia [J18.9] 01/08/2018    Hypoxemia [R09.02] 01/08/2018       Plan:        Acute Resp distress likely 2/2 Pneumonia  - Pulmonology following   - Resp eval and treat   - Started on Levo, Meropenem and Vanc   - Repeat ABG improved   - Albuterol Q2H PRN, Pulmicort, Duoneb  - BiPAP  - Blood cultures, sputum culture and UA ordered pending  - Legionella, mycoplasma and S.  Pneumo pending   - Continuous Pulse OX     Chest Pain   - Trops 0.03 x3  - ECHO pending   - Continue home medications  - Telemetry monitoring   - Cardiac diet      Radha Watkins MD  1/9/2018  7:57 AM

## 2018-01-09 NOTE — PROGRESS NOTES
RT called to patient bedside by RN stating patients sat decreasing, patient was ambued with 100% FIO2 with resistence.  Unable to pass suction cath through trach, changed trach out with 6.0 XL cuffed trach and placed on vent PRVC 16, , FIO2 100% peep %, sat remains around 94 to 98%

## 2018-01-10 LAB
ALBUMIN SERPL-MCNC: 3.5 G/DL (ref 3.5–5.2)
ALBUMIN/GLOBULIN RATIO: ABNORMAL (ref 1–2.5)
ALP BLD-CCNC: 63 U/L (ref 35–104)
ALT SERPL-CCNC: 12 U/L (ref 5–33)
ANION GAP SERPL CALCULATED.3IONS-SCNC: 7 MMOL/L (ref 9–17)
AST SERPL-CCNC: 15 U/L
BILIRUB SERPL-MCNC: 0.24 MG/DL (ref 0.3–1.2)
BUN BLDV-MCNC: 17 MG/DL (ref 8–23)
BUN/CREAT BLD: ABNORMAL (ref 9–20)
CALCIUM SERPL-MCNC: 9.6 MG/DL (ref 8.6–10.4)
CHLORIDE BLD-SCNC: 99 MMOL/L (ref 98–107)
CO2: 35 MMOL/L (ref 20–31)
CREAT SERPL-MCNC: 0.5 MG/DL (ref 0.5–0.9)
GFR AFRICAN AMERICAN: >60 ML/MIN
GFR NON-AFRICAN AMERICAN: >60 ML/MIN
GFR SERPL CREATININE-BSD FRML MDRD: ABNORMAL ML/MIN/{1.73_M2}
GFR SERPL CREATININE-BSD FRML MDRD: ABNORMAL ML/MIN/{1.73_M2}
GLUCOSE BLD-MCNC: 151 MG/DL (ref 70–99)
HCT VFR BLD CALC: 34.4 % (ref 36–46)
HEMOGLOBIN: 10.8 G/DL (ref 12–16)
MCH RBC QN AUTO: 24.9 PG (ref 26–34)
MCHC RBC AUTO-ENTMCNC: 31.4 G/DL (ref 31–37)
MCV RBC AUTO: 79.1 FL (ref 80–100)
MYCOPLASMA PNEUMONIAE IGM: 0.19
PDW BLD-RTO: 16.1 % (ref 11.5–14.9)
PLATELET # BLD: 138 K/UL (ref 150–450)
PMV BLD AUTO: 9.1 FL (ref 6–12)
POTASSIUM SERPL-SCNC: 4.8 MMOL/L (ref 3.7–5.3)
RBC # BLD: 4.35 M/UL (ref 4–5.2)
SODIUM BLD-SCNC: 141 MMOL/L (ref 135–144)
TOTAL PROTEIN: 6.7 G/DL (ref 6.4–8.3)
WBC # BLD: 12.2 K/UL (ref 3.5–11)

## 2018-01-10 PROCEDURE — 2060000000 HC ICU INTERMEDIATE R&B

## 2018-01-10 PROCEDURE — 6360000002 HC RX W HCPCS: Performed by: INTERNAL MEDICINE

## 2018-01-10 PROCEDURE — 6370000000 HC RX 637 (ALT 250 FOR IP): Performed by: STUDENT IN AN ORGANIZED HEALTH CARE EDUCATION/TRAINING PROGRAM

## 2018-01-10 PROCEDURE — 2580000003 HC RX 258: Performed by: STUDENT IN AN ORGANIZED HEALTH CARE EDUCATION/TRAINING PROGRAM

## 2018-01-10 PROCEDURE — 94762 N-INVAS EAR/PLS OXIMTRY CONT: CPT

## 2018-01-10 PROCEDURE — 6370000000 HC RX 637 (ALT 250 FOR IP): Performed by: NURSE PRACTITIONER

## 2018-01-10 PROCEDURE — 80053 COMPREHEN METABOLIC PANEL: CPT

## 2018-01-10 PROCEDURE — 99233 SBSQ HOSP IP/OBS HIGH 50: CPT | Performed by: INTERNAL MEDICINE

## 2018-01-10 PROCEDURE — 85027 COMPLETE CBC AUTOMATED: CPT

## 2018-01-10 PROCEDURE — 6360000002 HC RX W HCPCS: Performed by: STUDENT IN AN ORGANIZED HEALTH CARE EDUCATION/TRAINING PROGRAM

## 2018-01-10 PROCEDURE — 6360000002 HC RX W HCPCS: Performed by: EMERGENCY MEDICINE

## 2018-01-10 PROCEDURE — 2580000003 HC RX 258: Performed by: EMERGENCY MEDICINE

## 2018-01-10 PROCEDURE — 94640 AIRWAY INHALATION TREATMENT: CPT

## 2018-01-10 RX ORDER — METHYLPREDNISOLONE SODIUM SUCCINATE 125 MG/2ML
60 INJECTION, POWDER, LYOPHILIZED, FOR SOLUTION INTRAMUSCULAR; INTRAVENOUS 3 TIMES DAILY
Status: DISCONTINUED | OUTPATIENT
Start: 2018-01-10 | End: 2018-01-11

## 2018-01-10 RX ADMIN — Medication 10 ML: at 21:13

## 2018-01-10 RX ADMIN — MICONAZOLE NITRATE: 20.6 POWDER TOPICAL at 21:13

## 2018-01-10 RX ADMIN — VANCOMYCIN HYDROCHLORIDE 1500 MG: 1 INJECTION, POWDER, LYOPHILIZED, FOR SOLUTION INTRAVENOUS at 17:10

## 2018-01-10 RX ADMIN — METOPROLOL TARTRATE 25 MG: 25 TABLET ORAL at 08:11

## 2018-01-10 RX ADMIN — METHYLPREDNISOLONE SODIUM SUCCINATE 60 MG: 125 INJECTION, POWDER, FOR SOLUTION INTRAMUSCULAR; INTRAVENOUS at 21:12

## 2018-01-10 RX ADMIN — MEROPENEM 1 G: 1 INJECTION, POWDER, FOR SOLUTION INTRAVENOUS at 02:07

## 2018-01-10 RX ADMIN — LEVOFLOXACIN 500 MG: 5 INJECTION, SOLUTION INTRAVENOUS at 01:57

## 2018-01-10 RX ADMIN — VANCOMYCIN HYDROCHLORIDE 1500 MG: 1 INJECTION, POWDER, LYOPHILIZED, FOR SOLUTION INTRAVENOUS at 08:12

## 2018-01-10 RX ADMIN — LORAZEPAM 1 MG: 1 TABLET ORAL at 04:48

## 2018-01-10 RX ADMIN — IPRATROPIUM BROMIDE AND ALBUTEROL SULFATE 1 AMPULE: .5; 3 SOLUTION RESPIRATORY (INHALATION) at 15:32

## 2018-01-10 RX ADMIN — LORAZEPAM 1 MG: 1 TABLET ORAL at 11:44

## 2018-01-10 RX ADMIN — METOPROLOL TARTRATE 25 MG: 25 TABLET ORAL at 21:01

## 2018-01-10 RX ADMIN — LISINOPRIL 2.5 MG: 5 TABLET ORAL at 08:12

## 2018-01-10 RX ADMIN — Medication 10 ML: at 08:14

## 2018-01-10 RX ADMIN — LORAZEPAM 1 MG: 1 TABLET ORAL at 17:46

## 2018-01-10 RX ADMIN — METHYLPREDNISOLONE SODIUM SUCCINATE 80 MG: 125 INJECTION, POWDER, FOR SOLUTION INTRAMUSCULAR; INTRAVENOUS at 02:53

## 2018-01-10 RX ADMIN — ATORVASTATIN CALCIUM 40 MG: 40 TABLET, FILM COATED ORAL at 21:12

## 2018-01-10 RX ADMIN — LEVOFLOXACIN 500 MG: 5 INJECTION, SOLUTION INTRAVENOUS at 23:52

## 2018-01-10 RX ADMIN — FAMOTIDINE 20 MG: 20 TABLET, FILM COATED ORAL at 08:11

## 2018-01-10 RX ADMIN — IPRATROPIUM BROMIDE AND ALBUTEROL SULFATE 1 AMPULE: .5; 3 SOLUTION RESPIRATORY (INHALATION) at 11:59

## 2018-01-10 RX ADMIN — OXYBUTYNIN CHLORIDE 5 MG: 5 TABLET ORAL at 08:12

## 2018-01-10 RX ADMIN — FUROSEMIDE 40 MG: 40 TABLET ORAL at 08:11

## 2018-01-10 RX ADMIN — MICONAZOLE NITRATE: 20.6 POWDER TOPICAL at 08:13

## 2018-01-10 RX ADMIN — BUDESONIDE 250 MCG: 0.25 SUSPENSION RESPIRATORY (INHALATION) at 07:41

## 2018-01-10 RX ADMIN — MEROPENEM 1 G: 1 INJECTION, POWDER, FOR SOLUTION INTRAVENOUS at 21:12

## 2018-01-10 RX ADMIN — IPRATROPIUM BROMIDE AND ALBUTEROL SULFATE 1 AMPULE: .5; 3 SOLUTION RESPIRATORY (INHALATION) at 07:40

## 2018-01-10 ASSESSMENT — ENCOUNTER SYMPTOMS
BACK PAIN: 0
SHORTNESS OF BREATH: 1
VOMITING: 0
SORE THROAT: 0
COUGH: 1
DIARRHEA: 0
NAUSEA: 0
WHEEZING: 0
ABDOMINAL PAIN: 0
CONSTIPATION: 0

## 2018-01-10 ASSESSMENT — PAIN SCALES - GENERAL
PAINLEVEL_OUTOF10: 0

## 2018-01-10 NOTE — PROGRESS NOTES
Pharmacy Vancomycin Consult     Vancomycin Day: 2  Current Dosing: vancomycin 1250 mg every 8 hours    Temp max:  99.2    Recent Labs      01/08/18   1559  01/09/18   0427   BUN  14  15       Recent Labs      01/08/18   1559  01/09/18   0427   CREATININE  0.50  0.51       Recent Labs      01/08/18   1559  01/09/18   0427   WBC  11.4*  12.7*         Intake/Output Summary (Last 24 hours) at 01/09/18 2247  Last data filed at 01/09/18 0618   Gross per 24 hour   Intake 1561.25 ml   Output 0 ml   Net 1561.25 ml       Culture Date      Source                       Results  See micro    Ht Readings from Last 1 Encounters:   01/08/18 5' 2\" (1.575 m)        Wt Readings from Last 1 Encounters:   01/08/18 290 lb (131.5 kg)         Body mass index is 53.04 kg/m². Estimated Creatinine Clearance: 151 mL/min (based on SCr of 0.51 mg/dL).     Trough: 12.2 at 2133 on 1/9/2018    Assessment/Plan:  Change vancomycin to 1500 mg every 8 hours    Avis Finney, [place]   1/9/2018   10:49 PM

## 2018-01-10 NOTE — PROGRESS NOTES
[id number] [facility]    Progress Note    1/10/2018    7:29 AM    Name:   Brittney Ramirez  MRN:     [id number]     Acct:      [de-identified]   Room:   2007/2007-01  IP Day:  2  Admit Date:  1/8/2018  2:34 PM    PCP:   Rosendo Mcdonald MD  Code Status:  Full Code    Subjective:     C/C:   Chief Complaint   Patient presents with    Mental Health Problem     Interval History Status: improved. Patient seen and examined at bedside this morning. Pt feeling better this morning. She is on high flow O2 (.4 CATC). Pt noted to desat to 83 ovenight but overall has been tolerating being off the vent very well. Pt receiving breathing treatment this morning. Pt has no complaints of CP, n/v, fever or chills overnight. Pt has occasional cough. Brief History:     The patient is a 65 y.o.   female who presents with Mental Health Problem   and she is admitted to the hospital for the management of  Pneumonia. Pt was sent from [facility] for psychiatric issues. Pt arrived with Pink Slip from facility after patient was refusing treatment. Pt has been having thick secretions, unknown color or consistency, from her trach for several days. Pt is a poor historian and has difficulty speaking due to SOB and her trach. Pt states she has been having difficulty breathing, has occasional CP, HA, rhinorrhea, diarrhea several days ago that has since resolved. Pt denies any fever, chills, urinary problems or difficulty eating/swallowing. Pt has had 3 trachs in total. The current trach has been present for the past 1 year. Pt states she has had a stoke and MI in the past, no sensory or motor deficiencies.     In the ED, pt given bolus of fluids. Given Cipro and meropenem. Treated with Duoneb and solumedrol. CXR showed multiple prominent nodular opacities primarily within the left perihilar region. ABG 7.295/87.5/54.7/42. 5.     Review of Systems:     Review of Systems Constitutional: Negative for chills and fever. HENT: Negative for congestion and sore throat. Respiratory: Positive for cough and shortness of breath. Negative for wheezing. Cardiovascular: Negative for chest pain and palpitations. Gastrointestinal: Negative for abdominal pain, constipation, diarrhea, nausea and vomiting. Genitourinary: Negative for frequency and urgency. Musculoskeletal: Negative for back pain and neck pain. Skin: Negative for itching and rash. Neurological: Negative for dizziness and headaches. Medications: Allergies: Allergies   Allergen Reactions    Mobic [Meloxicam]     Pcn [Penicillins]     Risperidone Palpitations       Current Meds:   Scheduled Meds:    enoxaparin  1 mg/kg Subcutaneous BID    ARIPiprazole  10 mg Oral Daily    vancomycin  1,500 mg Intravenous Q8H    meropenem  1 g Intravenous Q8H    atorvastatin  40 mg Per NG tube Nightly    furosemide  40 mg Per NG tube Daily    lisinopril  2.5 mg Per NG tube Daily    metoprolol tartrate  25 mg PEG Tube BID    oxybutynin  5 mg PEG Tube Daily    miconazole   Topical BID    sodium chloride flush  10 mL Intravenous 2 times per day    famotidine  20 mg Oral BID    ipratropium-albuterol  1 ampule Inhalation Q4H WA    levofloxacin  500 mg Intravenous Q24H    budesonide  250 mcg Nebulization BID    methylPREDNISolone  80 mg Intravenous TID    vancomycin (VANCOCIN) intermittent dosing (placeholder)   Other RX Placeholder     Continuous Infusions:    sodium chloride 75 mL/hr at 01/08/18 2209     PRN Meds: LORazepam, sodium chloride flush, acetaminophen, ondansetron, nicotine, albuterol    Data:     Past Medical History:   has a past medical history of Bipolar 1 disorder ([address] Cervicalgia; CHF (congestive heart failure) ([facility]); and MRSA (methicillin resistant staph aureus) culture positive. Social History:   reports that she has never smoked.  She has never used smokeless tobacco.     Family rate, regular rhythm and normal heart sounds. Exam reveals no gallop and no friction rub. No murmur heard. Pulmonary/Chest: She breathing comfortably. She has no rales. She exhibits no tenderness. Abdominal: Soft. Bowel sounds are normal. She exhibits no distension. There is mild tenderness near umbilical hernia. There is no rebound and no guarding. Musculoskeletal: She exhibits no tenderness or deformity. Neurological: She is alert and oriented to person, place, and time. Skin: Skin is warm and dry. Assessment:        Primary Problem  Pneumonia    Active Hospital Problems    Diagnosis Date Noted    Tracheostomy obstruction ([address] [J95.09]     Pneumonia [J18.9] 01/08/2018    Hypoxemia [R09.02] 01/08/2018       Plan:        Acute and Chronic Resp Failure likely 2/2 Pneumonia  - Chronic trach, changed in the ED  - Pulmonology following   - ABG improved   - High flow O2  - Continue Levo, Meropenem and Vanc   - Albuterol Q2H PRN, Pulmicort, Duoneb  - Blood cultures: No growth to date  - Sputum culture pending  - Legionella, mycoplasma and S. Pneumo pending   - Continuous Pulse OX     Chronic Diastolic CHF   - Trops 6.76 x3  - ECHO: difficult study. LVEF estimated to be 55%.   - Continue Lasix and Lopressor   - Telemetry monitoring   - Cardiac diet      John Alan MD  1/10/2018  7:29 AM

## 2018-01-10 NOTE — CONSULTS
[address] [facility]                   [address] [facility], [address]                                   CONSULTATION    PATIENT NAME: Rosalva Lyn                     :        1955  MED REC NO:   [id number]                              ROOM:       2007  ACCOUNT NO:   [de-identified]                           ADMIT DATE: 2018  PROVIDER:     Filemon Pal    CONSULT DATE:  2018        REASON FOR CONSULTATION:  Acute respiratory failure. HISTORY OF PRESENT ILLNESS:  The patient is a 71-year-old female. The  patient presented to the hospital.  Reportedly, the patient was sent from  [facility] because of psychiatric issues and refusing therapy and  medications. Reportedly, she arrived with a pink slip. She had some thick  secretions from her trach and was complaining of dyspnea. The patient was  actually transferred to the ICU because she was placed on ventilator. I  received a phone call from nursing that the tracheostomy is currently  protruding. She is absolutely refusing anyone to fix it because she has a  history of claustrophobia and claims it is choking her. She has had  reportedly a history of three trachs, the most recent tracheostomy is 1  year. Of note, we were not able to get a history from the patient on why  she had the trach to begin with. There was a concern that she had a stroke  and chronic respiratory failure eventually requiring a tracheostomy. Currently, the patient is on broad-spectrum antibiotics on Levaquin,  meropenem, and vancomycin. Chest x-ray revealed evidence of right-sided  heart specifically dextrocardia and nodular opacities appreciated. Of  note, arterial blood gas was performed revealing a pH of 7.29, pCO2 87.5  with a pO2 of 54. Repeat ABG, pH of 7.3, a pCO2 of 63.6 with a pO2 126. PAST MEDICAL HISTORY:  Notable for a diagnosis of bipolar affective  disorder, history of chronic trach.   There is a
Dr. Medina Monteiro notified of new consult.
pulmonary edema,  multifocal pneumonia and/or underlying lung nodules.  Continued imaging  follow-up to resolution is recommended.  If the nodular opacities are shown  to be a persistent finding, CT is recommended further evaluation. 2. Similar findings suggesting dextrocardia. Other diagnostic test:CT ordered by primary      Assessment/Plan   65 y.o. female with history of CHF and a trach presenting for respiratory distress presumptively due to pneumonia and possibly CHF exacerbation. Respiratory:   - Continue antibiotics     - Breathing treatments PRN   - Follow sputum cultures   - Continuous pulse ox   - Replace vent with high flow O2       Critical care time spent reviewing labs/films, examining patient, collaborating with other physicians but excluding procedures for life threatening organ failure is _______minutes. Mickieminda Catia  Patient seen and examined independently by me. Above discussed and I agree with student note except where indicated in the EMR revision history. Also see my additional comments and changes indicated by discrete font, text color, italics, and/or initials. Labs, cultures, and radiographs where available were reviewed.      dictated  Electronically signed by Yovana Tyler MD on 1/9/2018 at 4:55 PM

## 2018-01-10 NOTE — PROGRESS NOTES
Patient states that she did not like  Abilify and does not want to take any mood stabilizing medications. Discussed with her son also at her bedside and he also reports that she has been having a lot of mood swings but has been very uncooperative as well as any medications are concerned. Superficial  .Mood -   labile  Thought process -   organized  Cognition -  fair  Memory- Recent-Adequate                Remote-OK  Orientation-Oriented to time ,place and person                                               Insight-Partial  Judgement-Superficial                                                Attempted to develop insight. Medications reviewed. Side effects of medications reviewed and medication education provided. No side effects including akathisia,tremers or orthostasis reported or observed. Plan: Various medications options were again discussed with her but she is refusing to take any mood stabilizing medications.

## 2018-01-10 NOTE — PROGRESS NOTES
meropenem  1 g Intravenous Q8H    atorvastatin  40 mg Per NG tube Nightly    furosemide  40 mg Per NG tube Daily    lisinopril  2.5 mg Per NG tube Daily    metoprolol tartrate  25 mg PEG Tube BID    oxybutynin  5 mg PEG Tube Daily    miconazole   Topical BID    sodium chloride flush  10 mL Intravenous 2 times per day    famotidine  20 mg Oral BID    ipratropium-albuterol  1 ampule Inhalation Q4H WA    levofloxacin  500 mg Intravenous Q24H    budesonide  250 mcg Nebulization BID    vancomycin (VANCOCIN) intermittent dosing (placeholder)   Other RX Placeholder      sodium chloride 75 mL/hr at 01/08/18 2209     LORazepam, sodium chloride flush, acetaminophen, ondansetron, nicotine, albuterol    LABS   CBC   Recent Labs      01/10/18   0435   WBC  12.2*   HGB  10.8*   HCT  34.4*   MCV  79.1*   PLT  138*     BMP:   Lab Results   Component Value Date     01/10/2018    K 4.8 01/10/2018    CL 99 01/10/2018    CO2 35 01/10/2018    BUN 17 01/10/2018    LABALBU 3.5 01/10/2018    CREATININE 0.50 01/10/2018    CALCIUM 9.6 01/10/2018    GFRAA >60 01/10/2018    LABGLOM >60 01/10/2018     ABGs:  Lab Results   Component Value Date    PHART 7.381 01/09/2018    PO2ART 128.0 01/09/2018    PPU4HBR 63.6 01/09/2018      Lab Results   Component Value Date    MODE PRVC 01/09/2018     Ionized Calcium:  No results found for: IONCA  Magnesium:    Lab Results   Component Value Date    MG 2.2 10/08/2016     Phosphorus:    Lab Results   Component Value Date    PHOS 2.5 10/08/2016        LIVER PROFILE   Recent Labs      01/10/18   0435   AST  15   ALT  12   BILITOT  0.24*   ALKPHOS  63     INR No results for input(s): INR in the last 72 hours.   PTT   Lab Results   Component Value Date    APTT 21.5 (L) 10/08/2016         RADIOLOGY     (See actual reports for details)    ASSESSMENT/PLAN     Patient Active Problem List   Diagnosis    Lower urinary tract infectious disease    Respiratory failure ([address]    NSTEMI (non-ST elevated

## 2018-01-11 LAB
ALBUMIN SERPL-MCNC: 3.5 G/DL (ref 3.5–5.2)
ALBUMIN/GLOBULIN RATIO: ABNORMAL (ref 1–2.5)
ALP BLD-CCNC: 71 U/L (ref 35–104)
ALT SERPL-CCNC: 16 U/L (ref 5–33)
ANION GAP SERPL CALCULATED.3IONS-SCNC: 9 MMOL/L (ref 9–17)
AST SERPL-CCNC: 15 U/L
BILIRUB SERPL-MCNC: 0.23 MG/DL (ref 0.3–1.2)
BUN BLDV-MCNC: 19 MG/DL (ref 8–23)
BUN/CREAT BLD: ABNORMAL (ref 9–20)
CALCIUM SERPL-MCNC: 9.6 MG/DL (ref 8.6–10.4)
CHLORIDE BLD-SCNC: 99 MMOL/L (ref 98–107)
CO2: 33 MMOL/L (ref 20–31)
CREAT SERPL-MCNC: 0.46 MG/DL (ref 0.5–0.9)
CULTURE: NORMAL
CULTURE: NORMAL
DIRECT EXAM: NORMAL
GFR AFRICAN AMERICAN: >60 ML/MIN
GFR NON-AFRICAN AMERICAN: >60 ML/MIN
GFR SERPL CREATININE-BSD FRML MDRD: ABNORMAL ML/MIN/{1.73_M2}
GFR SERPL CREATININE-BSD FRML MDRD: ABNORMAL ML/MIN/{1.73_M2}
GLUCOSE BLD-MCNC: 151 MG/DL (ref 70–99)
HCT VFR BLD CALC: 35.2 % (ref 36–46)
HEMOGLOBIN: 11.1 G/DL (ref 12–16)
Lab: NORMAL
MCH RBC QN AUTO: 24.8 PG (ref 26–34)
MCHC RBC AUTO-ENTMCNC: 31.6 G/DL (ref 31–37)
MCV RBC AUTO: 78.5 FL (ref 80–100)
PDW BLD-RTO: 16.6 % (ref 11.5–14.9)
PLATELET # BLD: 138 K/UL (ref 150–450)
PMV BLD AUTO: 9 FL (ref 6–12)
POTASSIUM SERPL-SCNC: 5.2 MMOL/L (ref 3.7–5.3)
RBC # BLD: 4.49 M/UL (ref 4–5.2)
SODIUM BLD-SCNC: 141 MMOL/L (ref 135–144)
SPECIMEN DESCRIPTION: NORMAL
STATUS: NORMAL
TOTAL PROTEIN: 6.9 G/DL (ref 6.4–8.3)
WBC # BLD: 12.2 K/UL (ref 3.5–11)

## 2018-01-11 PROCEDURE — 2060000000 HC ICU INTERMEDIATE R&B

## 2018-01-11 PROCEDURE — 6370000000 HC RX 637 (ALT 250 FOR IP): Performed by: NURSE PRACTITIONER

## 2018-01-11 PROCEDURE — 6360000002 HC RX W HCPCS: Performed by: EMERGENCY MEDICINE

## 2018-01-11 PROCEDURE — 2580000003 HC RX 258: Performed by: STUDENT IN AN ORGANIZED HEALTH CARE EDUCATION/TRAINING PROGRAM

## 2018-01-11 PROCEDURE — 6360000002 HC RX W HCPCS: Performed by: INTERNAL MEDICINE

## 2018-01-11 PROCEDURE — 2580000003 HC RX 258: Performed by: EMERGENCY MEDICINE

## 2018-01-11 PROCEDURE — 36415 COLL VENOUS BLD VENIPUNCTURE: CPT

## 2018-01-11 PROCEDURE — 6370000000 HC RX 637 (ALT 250 FOR IP): Performed by: STUDENT IN AN ORGANIZED HEALTH CARE EDUCATION/TRAINING PROGRAM

## 2018-01-11 PROCEDURE — 6360000002 HC RX W HCPCS: Performed by: STUDENT IN AN ORGANIZED HEALTH CARE EDUCATION/TRAINING PROGRAM

## 2018-01-11 PROCEDURE — 85027 COMPLETE CBC AUTOMATED: CPT

## 2018-01-11 PROCEDURE — 94640 AIRWAY INHALATION TREATMENT: CPT

## 2018-01-11 PROCEDURE — 99232 SBSQ HOSP IP/OBS MODERATE 35: CPT | Performed by: INTERNAL MEDICINE

## 2018-01-11 PROCEDURE — 31720 CLEARANCE OF AIRWAYS: CPT

## 2018-01-11 PROCEDURE — 80053 COMPREHEN METABOLIC PANEL: CPT

## 2018-01-11 PROCEDURE — 94762 N-INVAS EAR/PLS OXIMTRY CONT: CPT

## 2018-01-11 RX ORDER — ARIPIPRAZOLE 10 MG/1
10 TABLET ORAL DAILY
Qty: 30 TABLET | Refills: 3 | Status: CANCELLED | OUTPATIENT
Start: 2018-01-12

## 2018-01-11 RX ORDER — BUDESONIDE 0.25 MG/2ML
250 INHALANT ORAL 2 TIMES DAILY
Qty: 60 AMPULE | Refills: 3 | Status: CANCELLED | OUTPATIENT
Start: 2018-01-11

## 2018-01-11 RX ORDER — METHYLPREDNISOLONE SODIUM SUCCINATE 40 MG/ML
40 INJECTION, POWDER, LYOPHILIZED, FOR SOLUTION INTRAMUSCULAR; INTRAVENOUS 3 TIMES DAILY
Status: DISCONTINUED | OUTPATIENT
Start: 2018-01-11 | End: 2018-01-12

## 2018-01-11 RX ADMIN — MEROPENEM 1 G: 1 INJECTION, POWDER, FOR SOLUTION INTRAVENOUS at 18:53

## 2018-01-11 RX ADMIN — LORAZEPAM 1 MG: 1 TABLET ORAL at 13:43

## 2018-01-11 RX ADMIN — LORAZEPAM 1 MG: 1 TABLET ORAL at 20:44

## 2018-01-11 RX ADMIN — OXYBUTYNIN CHLORIDE 5 MG: 5 TABLET ORAL at 07:55

## 2018-01-11 RX ADMIN — MICONAZOLE NITRATE: 20.6 POWDER TOPICAL at 20:44

## 2018-01-11 RX ADMIN — BUDESONIDE 250 MCG: 0.25 SUSPENSION RESPIRATORY (INHALATION) at 20:04

## 2018-01-11 RX ADMIN — LORAZEPAM 1 MG: 1 TABLET ORAL at 00:45

## 2018-01-11 RX ADMIN — BUDESONIDE 250 MCG: 0.25 SUSPENSION RESPIRATORY (INHALATION) at 10:35

## 2018-01-11 RX ADMIN — MEROPENEM 1 G: 1 INJECTION, POWDER, FOR SOLUTION INTRAVENOUS at 03:20

## 2018-01-11 RX ADMIN — LORAZEPAM 1 MG: 1 TABLET ORAL at 07:55

## 2018-01-11 RX ADMIN — METOPROLOL TARTRATE 25 MG: 25 TABLET ORAL at 20:44

## 2018-01-11 RX ADMIN — ATORVASTATIN CALCIUM 40 MG: 40 TABLET, FILM COATED ORAL at 20:44

## 2018-01-11 RX ADMIN — IPRATROPIUM BROMIDE AND ALBUTEROL SULFATE 1 AMPULE: .5; 3 SOLUTION RESPIRATORY (INHALATION) at 15:29

## 2018-01-11 RX ADMIN — MEROPENEM 1 G: 1 INJECTION, POWDER, FOR SOLUTION INTRAVENOUS at 11:04

## 2018-01-11 RX ADMIN — VANCOMYCIN HYDROCHLORIDE 1500 MG: 1 INJECTION, POWDER, LYOPHILIZED, FOR SOLUTION INTRAVENOUS at 11:05

## 2018-01-11 RX ADMIN — METHYLPREDNISOLONE SODIUM SUCCINATE 60 MG: 125 INJECTION, POWDER, FOR SOLUTION INTRAMUSCULAR; INTRAVENOUS at 11:21

## 2018-01-11 RX ADMIN — FUROSEMIDE 40 MG: 40 TABLET ORAL at 07:55

## 2018-01-11 RX ADMIN — LISINOPRIL 2.5 MG: 5 TABLET ORAL at 07:56

## 2018-01-11 RX ADMIN — IPRATROPIUM BROMIDE AND ALBUTEROL SULFATE 1 AMPULE: .5; 3 SOLUTION RESPIRATORY (INHALATION) at 20:04

## 2018-01-11 RX ADMIN — METHYLPREDNISOLONE SODIUM SUCCINATE 60 MG: 125 INJECTION, POWDER, FOR SOLUTION INTRAMUSCULAR; INTRAVENOUS at 03:20

## 2018-01-11 RX ADMIN — IPRATROPIUM BROMIDE AND ALBUTEROL SULFATE 1 AMPULE: .5; 3 SOLUTION RESPIRATORY (INHALATION) at 10:34

## 2018-01-11 RX ADMIN — MICONAZOLE NITRATE: 20.6 POWDER TOPICAL at 07:58

## 2018-01-11 RX ADMIN — METHYLPREDNISOLONE SODIUM SUCCINATE 40 MG: 125 INJECTION, POWDER, FOR SOLUTION INTRAMUSCULAR; INTRAVENOUS at 19:30

## 2018-01-11 RX ADMIN — VANCOMYCIN HYDROCHLORIDE 1500 MG: 1 INJECTION, POWDER, LYOPHILIZED, FOR SOLUTION INTRAVENOUS at 01:17

## 2018-01-11 RX ADMIN — Medication 10 ML: at 20:45

## 2018-01-11 ASSESSMENT — ENCOUNTER SYMPTOMS
CONSTIPATION: 0
SHORTNESS OF BREATH: 1
ABDOMINAL PAIN: 0
BACK PAIN: 0
NAUSEA: 0
DIARRHEA: 0
WHEEZING: 0
COUGH: 1
SORE THROAT: 0
VOMITING: 0

## 2018-01-11 NOTE — PROGRESS NOTES
Pulmonary Progress Note  [facility]      Patient - Wilton Mascorro,  Age - 59 y.o.    - 1955      Room Number - 2109/2109-01   N -  [id number]   [facility]  Date of Admission -  2018  2:34 PM        Consulting Kermit Miller MD  Primary Care Physician - Lily Tobin MD     SUBJECTIVE   Remains stable  No distress    OBJECTIVE   VITALS    height is 5' 2\" (1.575 m) and weight is 290 lb (131.5 kg). Her oral temperature is 97.8 °F (36.6 °C). Her blood pressure is 125/72 and her pulse is 56. Her respiration is 21 and oxygen saturation is 95%. Body mass index is 53.04 kg/m². Temperature Range: Temp: 97.8 °F (36.6 °C) Temp  Av °F (36.7 °C)  Min: 97.8 °F (36.6 °C)  Max: 98.2 °F (36.8 °C)  BP Range:  Systolic (22NZZ), RFO:442 , Min:125 , OPH:284     Diastolic (57WHG), BVT:68, Min:68, Max:72    Pulse Range: Pulse  Av.2  Min: 56  Max: 79  Respiration Range: Resp  Av  Min: 16  Max: 21  Current Pulse Ox[de-identified]  SpO2: 95 %  24HR Pulse Ox Range:  SpO2  Av.8 %  Min: 94 %  Max: 98 %  Oxygen Amount and Delivery: O2 Flow Rate (L/min): 8 L/min    Wt Readings from Last 3 Encounters:   18 290 lb (131.5 kg)   13 (!) 327 lb 6.1 oz (148.5 kg)       I/O (24 Hours)    Intake/Output Summary (Last 24 hours) at 18 1225  Last data filed at 18 0503   Gross per 24 hour   Intake             2164 ml   Output                0 ml   Net             2164 ml       EXAM     General Appearance  Awake, alert, oriented, in no acute distress  HEENT - normocephalic, atraumatic. Neck - Supple,  trachea midline   Lungs - coarse  Heart Exam:PMI normal. No lifts, heaves, or thrills. RRR. No murmurs, clicks, gallops, or rubs  Abdomen Exam: Abdomen soft, non-tender.  BS normal.  Extremity Exam: no edema    MEDS      methylPREDNISolone  40 mg Intravenous TID    enoxaparin  1 mg/kg Subcutaneous BID    ARIPiprazole  10 mg Oral Daily

## 2018-01-11 NOTE — DISCHARGE SUMMARY
[address]    Discharge Summary     Patient ID: Tamera Tan  :  1955   MRN: [id number]     ACCOUNT:  [de-identified]   Patient's PCP: Carlos Manuel De La Torre MD  Admit Date: 2018   Discharge Date: 2018     Length of Stay: 4  Code Status:  Full Code  Admitting Physician: Steph Jo MD  Discharge Physician: Charly Marcelo MD     Active Discharge Diagnoses:     Primary Problem  Pneumonia      Hospital Problems  Active Hospital Problems    Diagnosis Date Noted    Tr[facility]ostomy obstruction ([facility] [J95.09]     Pneumonia [J18.9] 2018    Hypoxemia [R09.02] 2018     Admission Condition:  fair     Discharged Condition: good    Hospital Stay:     Hospital Course:  Tamera Tan is a 59 y.o. female who was admitted for the management of  Pneumonia , presented to ER after being pink slipped for noncompliance with Bipolar medications. Pt noted to have SOB. Pt treated with IV Levaquin, Meropenem, and Vancomycin. Pt initially placed on vent but switched to high flow O2 and is tolerating it well. FiO2 is at 30. Blood cultures and sputum culture were negative. Pt switched to oral Levaquin and Doxy for 6 more days. All consults are agreeable to pt being transferred [facility] to [facility]. Pt to follow with PCP in 1 week.     Significant therapeutic interventions: Vent, Abx    Significant Diagnostic Studies:   Labs / Micro:  CBC:   Lab Results   Component Value Date    WBC 12.2 2018    RBC 4.49 2018    HGB 11.1 2018    HCT 35.2 2018    MCV 78.5 2018    MCH 24.8 2018    MCHC 31.6 2018    RDW 16.6 2018     2018     BMP:    Lab Results   Component Value Date    GLUCOSE 151 2018     2018    K 5.2 2018    CL 99 2018    CO2 33 2018    ANIONGAP 9 2018    BUN 19 2018    CREATININE 0.46 2018    BUNCRER NOT REPORTED 2018 by mouth daily for 4 days        CHANGE how you take these medications    ipratropium-albuterol 0.5-2.5 (3) MG/3ML Soln nebulizer solution  Commonly known as:  DUONEB  What changed:  Another medication with the same name was removed. Continue taking this medication, and follow the directions you see here. CONTINUE taking these medications    acetaminophen 325 MG tablet  Commonly known as:  TYLENOL     albuterol sulfate  (90 Base) MCG/ACT inhaler  Inhale 4 puffs into the lungs every 4 hours as needed for Wheezing. aspirin 81 MG tablet     atorvastatin 40 MG tablet  Commonly known as:  LIPITOR  1 tablet by Per NG tube route nightly. BACID PO     carbamide peroxide 6.5 % otic solution  Commonly known as:  DEBROX     CEPACOL SORE THROAT PO     Dextromethorphan-guaiFENesin  MG/5ML Syrp     famotidine 20 MG tablet  Commonly known as:  PEPCID  1 tablet by PEG Tube route 2 times daily. fluticasone 50 MCG/ACT nasal spray  Commonly known as:  FLONASE     furosemide 40 MG tablet  Commonly known as:  LASIX  1 tablet by Per NG tube route daily. lisinopril 2.5 MG tablet  Commonly known as:  PRINIVIL;ZESTRIL  1 tablet by Per NG tube route daily. loperamide 1 MG/7.5ML Susp suspension  Commonly known as:  IMODIUM     LORazepam 1 MG tablet  Commonly known as:  ATIVAN     metoprolol tartrate 25 MG tablet  Commonly known as:  LOPRESSOR  1 tablet by PEG Tube route 2 times daily. nitroGLYCERIN 0.4 MG SL tablet  Commonly known as:  NITROSTAT     nystatin 161850 UNIT/GM powder  Commonly known as:  MYCOSTATIN  Apply to affected areas     nystatin Powd powder  Commonly known as:  MYCOSTATIN     oxybutynin 5 MG tablet  Commonly known as:  DITROPAN  1 tablet by PEG Tube route daily. polyethylene glycol powder  Commonly known as:  GLYCOLAX     risperiDONE 3 MG tablet  Commonly known as:  RISPERDAL  1 tablet by Per NG tube route daily.      sertraline 100 MG tablet  Commonly known as:  ZOLOFT STOP taking these medications    LATUDA 20 MG Tabs tablet  Generic drug:  lurasidone     UNABLE TO FIND           Where to Get Your Medications      These medications were sent to [facility], [address], [address] [facility] [address]    Phone:  [phone]   · ARIPiprazole 10 MG tablet  · doxycycline hyclate 100 MG tablet  · levofloxacin 750 MG tablet  · predniSONE 20 MG tablet         Time Spent on discharge is  20 mins in patient examination, evaluation, counseling as well as medication reconciliation, prescriptions for required medications, discharge plan and follow up. Electronically signed by   Surendra Castillo MD  1/12/2018  3:54 PM      Thank you Dr. Syed Munoz MD for the opportunity to be involved in this patient's care.

## 2018-01-11 NOTE — PROGRESS NOTES
Negative for congestion and sore throat. Respiratory: Positive for cough and shortness of breath. Negative for wheezing. Cardiovascular: Negative for chest pain and palpitations. Gastrointestinal: Negative for abdominal pain, constipation, diarrhea, nausea and vomiting. Genitourinary: Negative for frequency and urgency. Musculoskeletal: Negative for back pain and neck pain. Skin: Negative for itching and rash. Neurological: Negative for dizziness and headaches. Medications: Allergies: Allergies   Allergen Reactions    Mobic [Meloxicam]     Pcn [Penicillins]     Risperidone Palpitations       Current Meds:   Scheduled Meds:    methylPREDNISolone  60 mg Intravenous TID    enoxaparin  1 mg/kg Subcutaneous BID    ARIPiprazole  10 mg Oral Daily    vancomycin  1,500 mg Intravenous Q8H    meropenem  1 g Intravenous Q8H    atorvastatin  40 mg Per NG tube Nightly    furosemide  40 mg Per NG tube Daily    lisinopril  2.5 mg Per NG tube Daily    metoprolol tartrate  25 mg PEG Tube BID    oxybutynin  5 mg PEG Tube Daily    miconazole   Topical BID    sodium chloride flush  10 mL Intravenous 2 times per day    famotidine  20 mg Oral BID    ipratropium-albuterol  1 ampule Inhalation Q4H WA    levofloxacin  500 mg Intravenous Q24H    budesonide  250 mcg Nebulization BID    vancomycin (VANCOCIN) intermittent dosing (placeholder)   Other RX Placeholder     Continuous Infusions:    sodium chloride 75 mL/hr at 01/08/18 2209     PRN Meds: LORazepam, sodium chloride flush, acetaminophen, ondansetron, nicotine, albuterol    Data:     Past Medical History:   has a past medical history of Bipolar 1 disorder ([address] Cervicalgia; CH[facility]congestive heart failure) ([facility]); and MRSA (methicillin resistant staph aureus) culture positive. Social History:   reports that she has never smoked. She has never used smokeless tobacco.     Family History: History reviewed.  No pertinent family

## 2018-01-11 NOTE — PLAN OF CARE
Problem: Falls - Risk of  Goal: Absence of falls  Outcome: Ongoing  Patient safety maintained. Bed locked and lowest position. Call light within reach. Hourly rounding performed. No falls or injuries. Will continue to monitor.

## 2018-01-11 NOTE — PLAN OF CARE
Problem: Risk for Impaired Skin Integrity  Goal: Tissue integrity - skin and mucous membranes  Structural intactness and normal physiological function of skin and  mucous membranes. Outcome: Met This Shift  No break down noted. Mucous membranes pink, moist, and intact.

## 2018-01-11 NOTE — PROGRESS NOTES
Physical Therapy  DATE: 2018    NAME: Richar Belcher  MRN: [id number]   : 1955    Patient not seen this date for Physical Therapy due to:  [] Blood transfusion in progress  [] Hemodialysis  [x]  Patient Declined -- attempted x 3 today and patient refused despite much encouragement. Will check back as able for PT eval    [] Spine Precautions   [] Strict Bedrest  [] Surgery/ Procedure  [] Testing      [] Other        [] PT being discontinued at this time. Patient independent. No further needs. [] PT being discontinued at this time as the patient has been transferred to palliative care. No further needs.     Senthil Marvin, PT

## 2018-01-11 NOTE — PROGRESS NOTES
Physical Therapy  DATE: 2018    NAME: Tere Fitzpatrick  MRN: [id number]   : 1955    Patient not seen this date for Physical Therapy due to:  [] Blood transfusion in progress  [] Hemodialysis  []  Patient Declined  [] Spine Precautions   [] Strict Bedrest  [] Surgery/ Procedure  [] Testing      [x] Other -- per OT note, PT to be on hold today due to medical status. [] PT being discontinued at this time. Patient independent. No further needs. [] PT being discontinued at this time as the patient has been transferred to palliative care. No further needs.     Sherry Nagel, PT

## 2018-01-11 NOTE — PLAN OF CARE
Problem: Risk for Impaired Skin Integrity  Goal: Tissue integrity - skin and mucous membranes  Structural intactness and normal physiological function of skin and  mucous membranes. Outcome: Not Met This Shift  Patient with redness in manolo area. No other skin break down noted.

## 2018-01-12 VITALS
HEART RATE: 68 BPM | OXYGEN SATURATION: 94 % | DIASTOLIC BLOOD PRESSURE: 67 MMHG | HEIGHT: 62 IN | RESPIRATION RATE: 20 BRPM | SYSTOLIC BLOOD PRESSURE: 142 MMHG | BODY MASS INDEX: 53.37 KG/M2 | TEMPERATURE: 98.2 F | WEIGHT: 290 LBS

## 2018-01-12 PROCEDURE — G8979 MOBILITY GOAL STATUS: HCPCS

## 2018-01-12 PROCEDURE — 99232 SBSQ HOSP IP/OBS MODERATE 35: CPT | Performed by: INTERNAL MEDICINE

## 2018-01-12 PROCEDURE — 2580000003 HC RX 258: Performed by: EMERGENCY MEDICINE

## 2018-01-12 PROCEDURE — 6360000002 HC RX W HCPCS: Performed by: STUDENT IN AN ORGANIZED HEALTH CARE EDUCATION/TRAINING PROGRAM

## 2018-01-12 PROCEDURE — G8978 MOBILITY CURRENT STATUS: HCPCS

## 2018-01-12 PROCEDURE — 6370000000 HC RX 637 (ALT 250 FOR IP): Performed by: STUDENT IN AN ORGANIZED HEALTH CARE EDUCATION/TRAINING PROGRAM

## 2018-01-12 PROCEDURE — 97162 PT EVAL MOD COMPLEX 30 MIN: CPT

## 2018-01-12 PROCEDURE — 6360000002 HC RX W HCPCS: Performed by: INTERNAL MEDICINE

## 2018-01-12 PROCEDURE — 6360000002 HC RX W HCPCS: Performed by: EMERGENCY MEDICINE

## 2018-01-12 PROCEDURE — 94762 N-INVAS EAR/PLS OXIMTRY CONT: CPT

## 2018-01-12 PROCEDURE — 97535 SELF CARE MNGMENT TRAINING: CPT

## 2018-01-12 PROCEDURE — 6370000000 HC RX 637 (ALT 250 FOR IP): Performed by: INTERNAL MEDICINE

## 2018-01-12 PROCEDURE — 97165 OT EVAL LOW COMPLEX 30 MIN: CPT

## 2018-01-12 PROCEDURE — 94640 AIRWAY INHALATION TREATMENT: CPT

## 2018-01-12 PROCEDURE — 6370000000 HC RX 637 (ALT 250 FOR IP): Performed by: PSYCHIATRY & NEUROLOGY

## 2018-01-12 PROCEDURE — 6370000000 HC RX 637 (ALT 250 FOR IP): Performed by: NURSE PRACTITIONER

## 2018-01-12 RX ORDER — PREDNISONE 20 MG/1
20 TABLET ORAL DAILY
Status: DISCONTINUED | OUTPATIENT
Start: 2018-01-15 | End: 2018-01-12 | Stop reason: HOSPADM

## 2018-01-12 RX ORDER — DOXYCYCLINE HYCLATE 100 MG
100 TABLET ORAL 2 TIMES DAILY
Qty: 12 TABLET | Refills: 0 | Status: SHIPPED | OUTPATIENT
Start: 2018-01-12 | End: 2018-01-18

## 2018-01-12 RX ORDER — DOXYCYCLINE 100 MG/1
100 CAPSULE ORAL EVERY 12 HOURS SCHEDULED
Status: DISCONTINUED | OUTPATIENT
Start: 2018-01-12 | End: 2018-01-12 | Stop reason: HOSPADM

## 2018-01-12 RX ORDER — PREDNISONE 20 MG/1
40 TABLET ORAL DAILY
Qty: 8 TABLET | Refills: 0 | Status: SHIPPED | OUTPATIENT
Start: 2018-01-12 | End: 2018-01-16

## 2018-01-12 RX ORDER — LEVOFLOXACIN 750 MG/1
750 TABLET ORAL DAILY
Status: DISCONTINUED | OUTPATIENT
Start: 2018-01-12 | End: 2018-01-12 | Stop reason: HOSPADM

## 2018-01-12 RX ORDER — LEVOFLOXACIN 750 MG/1
750 TABLET ORAL DAILY
Qty: 6 TABLET | Refills: 0 | Status: SHIPPED | OUTPATIENT
Start: 2018-01-12 | End: 2018-01-18

## 2018-01-12 RX ORDER — ARIPIPRAZOLE 10 MG/1
10 TABLET ORAL DAILY
Qty: 30 TABLET | Refills: 3 | Status: SHIPPED | OUTPATIENT
Start: 2018-01-12

## 2018-01-12 RX ORDER — PREDNISONE 10 MG/1
10 TABLET ORAL DAILY
Status: DISCONTINUED | OUTPATIENT
Start: 2018-01-18 | End: 2018-01-12 | Stop reason: HOSPADM

## 2018-01-12 RX ADMIN — LORAZEPAM 1 MG: 1 TABLET ORAL at 09:58

## 2018-01-12 RX ADMIN — ATORVASTATIN CALCIUM 40 MG: 40 TABLET, FILM COATED ORAL at 20:27

## 2018-01-12 RX ADMIN — LEVOFLOXACIN 750 MG: 750 TABLET, FILM COATED ORAL at 13:29

## 2018-01-12 RX ADMIN — BUDESONIDE 250 MCG: 0.25 SUSPENSION RESPIRATORY (INHALATION) at 10:29

## 2018-01-12 RX ADMIN — LORAZEPAM 1 MG: 1 TABLET ORAL at 15:52

## 2018-01-12 RX ADMIN — LISINOPRIL 2.5 MG: 5 TABLET ORAL at 09:21

## 2018-01-12 RX ADMIN — PREDNISONE 30 MG: 20 TABLET ORAL at 15:52

## 2018-01-12 RX ADMIN — MICONAZOLE NITRATE: 20.6 POWDER TOPICAL at 09:22

## 2018-01-12 RX ADMIN — IPRATROPIUM BROMIDE AND ALBUTEROL SULFATE 1 AMPULE: .5; 3 SOLUTION RESPIRATORY (INHALATION) at 15:40

## 2018-01-12 RX ADMIN — METHYLPREDNISOLONE SODIUM SUCCINATE 40 MG: 125 INJECTION, POWDER, FOR SOLUTION INTRAMUSCULAR; INTRAVENOUS at 01:51

## 2018-01-12 RX ADMIN — METOPROLOL TARTRATE 25 MG: 25 TABLET ORAL at 09:21

## 2018-01-12 RX ADMIN — FUROSEMIDE 40 MG: 40 TABLET ORAL at 09:21

## 2018-01-12 RX ADMIN — DOXYCYCLINE 100 MG: 100 CAPSULE ORAL at 13:29

## 2018-01-12 RX ADMIN — LEVOFLOXACIN 500 MG: 5 INJECTION, SOLUTION INTRAVENOUS at 00:38

## 2018-01-12 RX ADMIN — METOPROLOL TARTRATE 25 MG: 25 TABLET ORAL at 20:27

## 2018-01-12 RX ADMIN — IPRATROPIUM BROMIDE AND ALBUTEROL SULFATE 1 AMPULE: .5; 3 SOLUTION RESPIRATORY (INHALATION) at 10:30

## 2018-01-12 RX ADMIN — MEROPENEM 1 G: 1 INJECTION, POWDER, FOR SOLUTION INTRAVENOUS at 01:51

## 2018-01-12 RX ADMIN — LORAZEPAM 1 MG: 1 TABLET ORAL at 04:13

## 2018-01-12 ASSESSMENT — ENCOUNTER SYMPTOMS
SHORTNESS OF BREATH: 0
VOMITING: 0
NAUSEA: 0
DIARRHEA: 0
SORE THROAT: 0
ABDOMINAL PAIN: 0
BACK PAIN: 0
CONSTIPATION: 0
COUGH: 1
WHEEZING: 0

## 2018-01-12 ASSESSMENT — PAIN SCALES - GENERAL
PAINLEVEL_OUTOF10: 0

## 2018-01-12 NOTE — PROGRESS NOTES
mild residual some of which now have a somewhat nodular configuration. Findings may reflect improving vascular congestion and pulmonary edema, multifocal pneumonia and/or underlying lung nodules. Continued imaging follow-up to resolution is recommended. If the nodular opacities are shown to be a persistent finding, CT is recommended further evaluation. 2. Similar findings suggesting dextrocardia. Xr Chest Portable    Result Date: 1/8/2018  EXAMINATION: SINGLE VIEW OF THE CHEST 1/8/2018 7:41 pm COMPARISON: 01/08/2018 at 19:24 HISTORY: ORDERING SYSTEM PROVIDED HISTORY: post trach exchange TECHNOLOGIST PROVIDED HISTORY: Reason for exam:->post trach exchange Ordering Physician Provided Reason for Exam: post trach exchange Acuity: Unknown Type of Exam: Unknown FINDINGS: Tracheostomy tube remains in place. Stable cardiomediastinal silhouette partly obscured by patchy airspace subtle nodular appearing densities unchanged from prior. More pronounced on the right. No pneumothorax. Tracheostomy tube has been changed as per history. No change in bilateral patchy infiltrates. Xr Chest Portable    Result Date: 1/8/2018  EXAMINATION: SINGLE VIEW OF THE CHEST 1/8/2018 7:23 pm COMPARISON: 01/08/2018 at 16:13 HISTORY: ORDERING SYSTEM PROVIDED HISTORY: respiratory distress TECHNOLOGIST PROVIDED HISTORY: Reason for exam:->respiratory distress Ordering Physician Provided Reason for Exam: respiratory distress Acuity: Unknown Type of Exam: Unknown FINDINGS: Tracheostomy tube in place. Cardiomediastinal silhouette is obscured by diffuse patchy airspace opacities showing some progression from prior more pronounced on the right side. On the left side only the lung bases involved. Left perihilar nodular opacities are more obscured on the current exam. These could be vascular. Differential be multifocal pneumonia large asymmetric vascular congestion with some basilar atelectasis.      Patchy airspace and interstitial

## 2018-01-12 NOTE — PROGRESS NOTES
restricted    AM-PAC Score     AM-PAC Inpatient Mobility without Stair Climbing Raw Score : 5  AM-PAC Inpatient without Stair Climbing T-Scale Score : 23.59  Mobility Inpatient CMS 0-100% Score: 100  Mobility Inpatient without Stair CMS G-Code Modifier : CN       Goals  Short term goals  Time Frame for Short term goals: 6 visits  Short term goal 1: Pt Marty for bed mobility  Short term goal 2: Pt modA for transfers  Short term goal 3: Pt particpate in 30 minutes ther ex to increase endurance for mobility  Short term goal 4: Assess gait when appropriate/pt willing to participate  Patient Goals   Patient goals :  To rest and return to [facility]       Therapy Time   Individual Concurrent Group Co-treatment   Time In 0825         Time Out 0840         Minutes [place], [place]

## 2018-01-12 NOTE — PLAN OF CARE
Problem: Falls - Risk of  Goal: Absence of falls  Outcome: Ongoing  Patient safety maintained. Bed locked and lowest position. Call light within reach. Hourly rounding performed. No falls or injuries. Will continue to monitor. Problem: Risk for Impaired Skin Integrity  Goal: Tissue integrity - skin and mucous membranes  Structural intactness and normal physiological function of skin and  mucous membranes. Outcome: Ongoing  Patient with known redness in manolo area. No other skin breakdown noted.

## 2018-01-12 NOTE — CARE COORDINATION
TREVER received final DC orders for this patient to admit back to [address] [place] [address]. TREVER set up transportation and informed the staff here, the facility, and TREVER also called her daughter and left a voice mail message concerning the DC/transport time. The patient is scheduled to be picked up at 6:30pm via stretcher with O2 to the trach (30%). TREVER faxed the DC information to the number received form liaison. TREVER also provided the number for report to this patient's nurse.

## 2018-01-12 NOTE — PROGRESS NOTES
[facility]: IBETH BRUNER   Occupational Therapy Evaluation  Date: 18  Patient Name: Denton Rosado       Room:   MRN: [id number]  Account: [de-identified]   : 1955  (65 y.o.) Gender: female        Diagnosis: pneumonia, acute on chronic respiratory failure- has trach with oxygen mask, CHF          Past Medical History:  has a past medical history of Bipolar 1 disorder ([address] Cervicalgia; CHF (congestive heart failure) ([facility]); and MRSA (methicillin resistant staph aureus) culture positive. Past Surgical History:   has no past surgical history on file. Restrictions  Restrictions/Precautions: General Precautions, Isolation  Other position/activity restrictions: pt with trache, on O2  Position Activity Restriction  Other position/activity restrictions: pt with trache, on O2  Required Braces or Orthoses?: No     Vitals  Temp: 98.2 °F (36.8 °C)  Pulse: 68  Resp: 20  BP: (!) 142/67  Height: 5' 2\" (157.5 cm)  Weight: 290 lb (131.5 kg)  BMI (Calculated): 53.2  Oxygen Therapy  SpO2: 95 %  Pulse Oximeter Device Mode: Continuous  Pulse Oximeter Device Location: Finger  O2 Device: None (Room air)  FiO2 : 30 %  O2 Flow Rate (L/min): 8 L/min  End Tidal CO2: 55 (%)  Blood Gas  Performed?: No  Level of Consciousness: Alert    Subjective  Subjective: declined offer to obtain BSC and toilet, notes she has been using briefs and states \"This bed does not go down far enough and my legs are short and it is too hard. \" ed re activity promotion to maintain ambulatory status but continues to decline     Vision  Vision: Within Functional Limits  Hearing  Hearing: Within functional limits  Social/Functional History  Lives With: Other (comment) (facility)  Type of Home: Facility  Home Layout: One level  Home Access: Level entry  Home Equipment: Rolling walker  Receives Help From: Personal care attendant  ADL Assistance: Needs assistance  Homemaking Responsibilities: No  Ambulation Assistance: Independent Tolerance:  ed re activity promotion to maintain ambulatory status but continues to decline                Goals  Patient Goals   Patient goals : return to facility, ambulate to bathroom indep  Short term goals  Time Frame for Short term goals: 1 week  Short term goal 1: charley 4-5 min stand, amb with CGA and good balance  Short term goal 2: indep toileting  Short term goal 3: CGA adl transfers  Short term goal 4: charley 20 min seated EOB during adls, UE ex  Short term goal 5: set up UE bathe and dress and grooming    Plan        Plan  Times per week: 4-5  Times per day: Daily  Current Treatment Recommendations: Functional Mobility Training, Endurance Training, Self-Care / ADL    OT Equipment Recommendations  Equipment Needed: No  OT Individual Minutes  Time In: 7246  Time Out: 3732  Minutes: 30    Electronically signed by Alexi Najera OT on 1/12/18 at 3:44 PM

## 2018-01-15 LAB
CULTURE: NORMAL
Lab: NORMAL
SPECIMEN DESCRIPTION: NORMAL
STATUS: NORMAL
STATUS: NORMAL

## 2018-01-18 ENCOUNTER — HOSPITAL ENCOUNTER (OUTPATIENT)
Age: 63
Setting detail: SPECIMEN
Discharge: HOME | End: 2018-01-18
Payer: COMMERCIAL

## 2018-01-18 LAB
ANION GAP SERPL CALCULATED.3IONS-SCNC: 9 MMOL/L (ref 9–17)
BUN BLDV-MCNC: 19 MG/DL (ref 8–23)
BUN/CREAT BLD: 41 (ref 9–20)
CALCIUM SERPL-MCNC: 9.3 MG/DL (ref 8.6–10.4)
CHLORIDE BLD-SCNC: 93 MMOL/L (ref 98–107)
CO2: 36 MMOL/L (ref 20–31)
CREAT SERPL-MCNC: 0.46 MG/DL (ref 0.5–0.9)
GFR AFRICAN AMERICAN: >60 ML/MIN
GFR NON-AFRICAN AMERICAN: >60 ML/MIN
GFR SERPL CREATININE-BSD FRML MDRD: ABNORMAL ML/MIN/{1.73_M2}
GFR SERPL CREATININE-BSD FRML MDRD: ABNORMAL ML/MIN/{1.73_M2}
GLUCOSE BLD-MCNC: 118 MG/DL (ref 70–99)
POTASSIUM SERPL-SCNC: 4.7 MMOL/L (ref 3.7–5.3)
SODIUM BLD-SCNC: 138 MMOL/L (ref 135–144)

## 2018-01-18 PROCEDURE — 36415 COLL VENOUS BLD VENIPUNCTURE: CPT

## 2018-01-18 PROCEDURE — P9603 ONE-WAY ALLOW PRORATED MILES: HCPCS

## 2018-01-18 PROCEDURE — 80048 BASIC METABOLIC PNL TOTAL CA: CPT

## 2018-02-12 ENCOUNTER — HOSPITAL ENCOUNTER (OUTPATIENT)
Age: 63
Setting detail: SPECIMEN
Discharge: HOME | End: 2018-02-12
Payer: COMMERCIAL

## 2018-02-12 PROCEDURE — 86403 PARTICLE AGGLUT ANTBDY SCRN: CPT

## 2018-02-12 PROCEDURE — 87205 SMEAR GRAM STAIN: CPT

## 2018-02-12 PROCEDURE — 87070 CULTURE OTHR SPECIMN AEROBIC: CPT

## 2018-02-12 PROCEDURE — 87186 SC STD MICRODIL/AGAR DIL: CPT

## 2018-02-14 LAB
CULTURE: ABNORMAL
CULTURE: ABNORMAL
DIRECT EXAM: ABNORMAL
DIRECT EXAM: ABNORMAL
Lab: ABNORMAL
Lab: ABNORMAL
ORGANISM: ABNORMAL
SPECIMEN DESCRIPTION: ABNORMAL
SPECIMEN DESCRIPTION: ABNORMAL
STATUS: ABNORMAL

## 2018-03-02 ENCOUNTER — HOSPITAL ENCOUNTER (OUTPATIENT)
Age: 63
Setting detail: SPECIMEN
Discharge: HOME | End: 2018-03-02
Payer: COMMERCIAL

## 2018-03-02 LAB
ANION GAP SERPL CALCULATED.3IONS-SCNC: 7 MMOL/L (ref 9–17)
BUN BLDV-MCNC: 14 MG/DL (ref 8–23)
BUN/CREAT BLD: 23 (ref 9–20)
CALCIUM SERPL-MCNC: 9 MG/DL (ref 8.6–10.4)
CHLORIDE BLD-SCNC: 94 MMOL/L (ref 98–107)
CO2: 41 MMOL/L (ref 20–31)
CREAT SERPL-MCNC: 0.62 MG/DL (ref 0.5–0.9)
GFR AFRICAN AMERICAN: >60 ML/MIN
GFR NON-AFRICAN AMERICAN: >60 ML/MIN
GFR SERPL CREATININE-BSD FRML MDRD: ABNORMAL ML/MIN/{1.73_M2}
GFR SERPL CREATININE-BSD FRML MDRD: ABNORMAL ML/MIN/{1.73_M2}
GLUCOSE BLD-MCNC: 110 MG/DL (ref 70–99)
POTASSIUM SERPL-SCNC: 4.2 MMOL/L (ref 3.7–5.3)
SODIUM BLD-SCNC: 142 MMOL/L (ref 135–144)

## 2018-03-02 PROCEDURE — P9603 ONE-WAY ALLOW PRORATED MILES: HCPCS

## 2018-03-02 PROCEDURE — 36415 COLL VENOUS BLD VENIPUNCTURE: CPT

## 2018-03-02 PROCEDURE — 80048 BASIC METABOLIC PNL TOTAL CA: CPT

## 2018-03-05 ENCOUNTER — HOSPITAL ENCOUNTER (OUTPATIENT)
Age: 63
Setting detail: SPECIMEN
Discharge: HOME | End: 2018-03-05
Payer: COMMERCIAL

## 2018-03-05 LAB
ANION GAP SERPL CALCULATED.3IONS-SCNC: 7 MMOL/L (ref 9–17)
BUN BLDV-MCNC: 12 MG/DL (ref 8–23)
BUN/CREAT BLD: 18 (ref 9–20)
CALCIUM SERPL-MCNC: 9.4 MG/DL (ref 8.6–10.4)
CHLORIDE BLD-SCNC: 97 MMOL/L (ref 98–107)
CO2: 39 MMOL/L (ref 20–31)
CREAT SERPL-MCNC: 0.66 MG/DL (ref 0.5–0.9)
GFR AFRICAN AMERICAN: >60 ML/MIN
GFR NON-AFRICAN AMERICAN: >60 ML/MIN
GFR SERPL CREATININE-BSD FRML MDRD: ABNORMAL ML/MIN/{1.73_M2}
GFR SERPL CREATININE-BSD FRML MDRD: ABNORMAL ML/MIN/{1.73_M2}
GLUCOSE BLD-MCNC: 129 MG/DL (ref 70–99)
HCT VFR BLD CALC: 34.8 % (ref 36–46)
HEMOGLOBIN: 10.9 G/DL (ref 12–16)
MAGNESIUM: 2.3 MG/DL (ref 1.6–2.6)
MCH RBC QN AUTO: 24.3 PG (ref 26–34)
MCHC RBC AUTO-ENTMCNC: 31.2 G/DL (ref 31–37)
MCV RBC AUTO: 77.8 FL (ref 80–100)
NRBC AUTOMATED: ABNORMAL PER 100 WBC
PDW BLD-RTO: 17.8 % (ref 11–14.5)
PLATELET # BLD: 135 K/UL (ref 140–450)
PMV BLD AUTO: 8.7 FL (ref 6–12)
POTASSIUM SERPL-SCNC: 4.4 MMOL/L (ref 3.7–5.3)
RBC # BLD: 4.48 M/UL (ref 4–5.2)
SODIUM BLD-SCNC: 143 MMOL/L (ref 135–144)
TSH SERPL DL<=0.05 MIU/L-ACNC: 1.62 MIU/L (ref 0.3–5)
WBC # BLD: 7.9 K/UL (ref 3.5–11)

## 2018-03-05 PROCEDURE — 83735 ASSAY OF MAGNESIUM: CPT

## 2018-03-05 PROCEDURE — 84443 ASSAY THYROID STIM HORMONE: CPT

## 2018-03-05 PROCEDURE — 85027 COMPLETE CBC AUTOMATED: CPT

## 2018-03-05 PROCEDURE — 80048 BASIC METABOLIC PNL TOTAL CA: CPT

## 2018-03-05 PROCEDURE — 36415 COLL VENOUS BLD VENIPUNCTURE: CPT

## 2018-03-08 ENCOUNTER — APPOINTMENT (OUTPATIENT)
Dept: GENERAL RADIOLOGY | Age: 63
DRG: 207 | End: 2018-03-08
Attending: INTERNAL MEDICINE
Payer: COMMERCIAL

## 2018-03-08 ENCOUNTER — HOSPITAL ENCOUNTER (INPATIENT)
Age: 63
LOS: 7 days | Discharge: NURSING, REHAB OR LONG-TERM CARE | DRG: 207 | End: 2018-03-15
Attending: INTERNAL MEDICINE | Admitting: INTERNAL MEDICINE
Payer: COMMERCIAL

## 2018-03-08 PROBLEM — N39.0 URINARY TRACT INFECTION WITH HEMATURIA: Status: ACTIVE | Noted: 2018-03-08

## 2018-03-08 PROBLEM — R31.9 URINARY TRACT INFECTION WITH HEMATURIA: Status: ACTIVE | Noted: 2018-03-08

## 2018-03-08 PROBLEM — J18.9 BILATERAL PNEUMONIA: Status: ACTIVE | Noted: 2018-03-08

## 2018-03-08 PROBLEM — F31.9 BIPOLAR DISORDER (HCC): Chronic | Status: ACTIVE | Noted: 2018-03-08

## 2018-03-08 PROBLEM — I48.91 ATRIAL FIBRILLATION (HCC): Chronic | Status: ACTIVE | Noted: 2018-03-08

## 2018-03-08 PROBLEM — Z93.0 TRACHEOSTOMY PRESENT (HCC): Status: ACTIVE | Noted: 2018-03-08

## 2018-03-08 PROBLEM — R73.9 HYPERGLYCEMIA: Status: ACTIVE | Noted: 2018-03-08

## 2018-03-08 PROBLEM — J96.02 ACUTE RESPIRATORY FAILURE WITH HYPERCAPNIA (HCC): Status: ACTIVE | Noted: 2018-03-08

## 2018-03-08 PROBLEM — I48.91 ATRIAL FIBRILLATION (HCC): Status: ACTIVE | Noted: 2018-03-08

## 2018-03-08 PROBLEM — J69.0 ASPIRATION PNEUMONIA (HCC): Status: ACTIVE | Noted: 2018-01-08

## 2018-03-08 PROBLEM — J96.00 ACUTE RESPIRATORY FAILURE (HCC): Status: ACTIVE | Noted: 2018-03-08

## 2018-03-08 LAB
-: ABNORMAL
ABSOLUTE EOS #: 0 K/UL (ref 0–0.4)
ABSOLUTE IMMATURE GRANULOCYTE: 0 K/UL (ref 0–0.3)
ABSOLUTE LYMPH #: 0.74 K/UL (ref 1–4.8)
ABSOLUTE MONO #: 0.25 K/UL (ref 0.1–0.8)
ACTION: NORMAL
ALBUMIN SERPL-MCNC: 3.2 G/DL (ref 3.5–5.2)
ALBUMIN/GLOBULIN RATIO: 1 (ref 1–2.5)
ALLEN TEST: POSITIVE
ALP BLD-CCNC: 56 U/L (ref 35–104)
ALT SERPL-CCNC: 8 U/L (ref 5–33)
AMORPHOUS: ABNORMAL
ANION GAP SERPL CALCULATED.3IONS-SCNC: 6 MMOL/L (ref 9–17)
ANION GAP SERPL CALCULATED.3IONS-SCNC: 6 MMOL/L (ref 9–17)
AST SERPL-CCNC: 13 U/L
BACTERIA: ABNORMAL
BASOPHILS # BLD: 0 % (ref 0–2)
BASOPHILS ABSOLUTE: 0 K/UL (ref 0–0.2)
BILIRUB SERPL-MCNC: 0.64 MG/DL (ref 0.3–1.2)
BILIRUBIN URINE: NEGATIVE
BUN BLDV-MCNC: 15 MG/DL (ref 8–23)
BUN BLDV-MCNC: 16 MG/DL (ref 8–23)
BUN/CREAT BLD: ABNORMAL (ref 9–20)
BUN/CREAT BLD: ABNORMAL (ref 9–20)
CALCIUM IONIZED: 1.14 MMOL/L (ref 1.13–1.33)
CALCIUM SERPL-MCNC: 8.2 MG/DL (ref 8.6–10.4)
CALCIUM SERPL-MCNC: 8.9 MG/DL (ref 8.6–10.4)
CASTS UA: ABNORMAL /LPF (ref 0–2)
CHLORIDE BLD-SCNC: 93 MMOL/L (ref 98–107)
CHLORIDE BLD-SCNC: 97 MMOL/L (ref 98–107)
CO2: 38 MMOL/L (ref 20–31)
CO2: 40 MMOL/L (ref 20–31)
COLOR: YELLOW
COMMENT UA: ABNORMAL
CREAT SERPL-MCNC: 0.58 MG/DL (ref 0.5–0.9)
CREAT SERPL-MCNC: 0.63 MG/DL (ref 0.5–0.9)
CRYSTALS, UA: ABNORMAL /HPF
DATE AND TIME: NORMAL
DIFFERENTIAL TYPE: ABNORMAL
DIRECT EXAM: NORMAL
EKG ATRIAL RATE: 71 BPM
EKG P AXIS: 57 DEGREES
EKG P-R INTERVAL: 122 MS
EKG Q-T INTERVAL: 430 MS
EKG QRS DURATION: 84 MS
EKG QTC CALCULATION (BAZETT): 467 MS
EKG R AXIS: 35 DEGREES
EKG T AXIS: 129 DEGREES
EKG VENTRICULAR RATE: 71 BPM
EOSINOPHILS RELATIVE PERCENT: 0 % (ref 1–4)
EPITHELIAL CELLS UA: ABNORMAL /HPF (ref 0–5)
FIO2: 50
GFR AFRICAN AMERICAN: >60 ML/MIN
GFR AFRICAN AMERICAN: >60 ML/MIN
GFR NON-AFRICAN AMERICAN: >60 ML/MIN
GFR NON-AFRICAN AMERICAN: >60 ML/MIN
GFR SERPL CREATININE-BSD FRML MDRD: ABNORMAL ML/MIN/{1.73_M2}
GLUCOSE BLD-MCNC: 122 MG/DL (ref 70–99)
GLUCOSE BLD-MCNC: 159 MG/DL (ref 65–105)
GLUCOSE BLD-MCNC: 96 MG/DL (ref 70–99)
GLUCOSE URINE: NEGATIVE
HCT VFR BLD CALC: 31.4 % (ref 36.3–47.1)
HEMOGLOBIN: 8.8 G/DL (ref 11.9–15.1)
IMMATURE GRANULOCYTES: 0 %
KETONES, URINE: NEGATIVE
LACTIC ACID, WHOLE BLOOD: 1.1 MMOL/L (ref 0.7–2.1)
LACTIC ACID: NORMAL MMOL/L
LEUKOCYTE ESTERASE, URINE: ABNORMAL
LYMPHOCYTES # BLD: 12 % (ref 24–44)
Lab: NORMAL
Lab: NORMAL
MAGNESIUM: 2 MG/DL (ref 1.6–2.6)
MCH RBC QN AUTO: 23.6 PG (ref 25.2–33.5)
MCHC RBC AUTO-ENTMCNC: 28 G/DL (ref 28.4–34.8)
MCV RBC AUTO: 84.2 FL (ref 82.6–102.9)
MODE: ABNORMAL
MONOCYTES # BLD: 4 % (ref 1–7)
MORPHOLOGY: ABNORMAL
MRSA, DNA, NASAL: ABNORMAL
MUCUS: ABNORMAL
NEGATIVE BASE EXCESS, ART: ABNORMAL (ref 0–2)
NITRITE, URINE: NEGATIVE
NOTIFY: NORMAL
NRBC AUTOMATED: 0 PER 100 WBC
O2 DEVICE/FLOW/%: ABNORMAL
OTHER OBSERVATIONS UA: ABNORMAL
PATIENT TEMP: ABNORMAL
PDW BLD-RTO: 16.2 % (ref 11.8–14.4)
PH UA: >9 (ref 5–8)
PHOSPHORUS: 2 MG/DL (ref 2.6–4.5)
PLATELET # BLD: ABNORMAL K/UL (ref 138–453)
PLATELET ESTIMATE: ABNORMAL
PLATELET, FLUORESCENCE: 128 K/UL (ref 138–453)
PLATELET, IMMATURE FRACTION: 5 % (ref 1.1–10.3)
PMV BLD AUTO: ABNORMAL FL (ref 8.1–13.5)
POC HCO3: 42.3 MMOL/L (ref 21–28)
POC O2 SATURATION: 96 % (ref 94–98)
POC PCO2 TEMP: ABNORMAL MM HG
POC PCO2: 65.3 MM HG (ref 35–48)
POC PH TEMP: ABNORMAL
POC PH: 7.42 (ref 7.35–7.45)
POC PO2 TEMP: ABNORMAL MM HG
POC PO2: 85.5 MM HG (ref 83–108)
POSITIVE BASE EXCESS, ART: 15 (ref 0–3)
POTASSIUM SERPL-SCNC: 3.5 MMOL/L (ref 3.7–5.3)
POTASSIUM SERPL-SCNC: 3.7 MMOL/L (ref 3.7–5.3)
PROTEIN UA: ABNORMAL
RBC # BLD: 3.73 M/UL (ref 3.95–5.11)
RBC # BLD: ABNORMAL 10*6/UL
RBC UA: ABNORMAL /HPF (ref 0–2)
READ BACK: YES
RENAL EPITHELIAL, UA: ABNORMAL /HPF
SAMPLE SITE: ABNORMAL
SEG NEUTROPHILS: 84 % (ref 36–66)
SEGMENTED NEUTROPHILS ABSOLUTE COUNT: 5.21 K/UL (ref 1.8–7.7)
SODIUM BLD-SCNC: 139 MMOL/L (ref 135–144)
SODIUM BLD-SCNC: 141 MMOL/L (ref 135–144)
SPECIFIC GRAVITY UA: 1.02 (ref 1–1.03)
SPECIMEN DESCRIPTION: ABNORMAL
SPECIMEN DESCRIPTION: NORMAL
SPECIMEN DESCRIPTION: NORMAL
STATUS: NORMAL
STATUS: NORMAL
TCO2 (CALC), ART: 44 MMOL/L (ref 22–29)
TOTAL PROTEIN: 6.3 G/DL (ref 6.4–8.3)
TRICHOMONAS: ABNORMAL
TURBIDITY: ABNORMAL
URINE HGB: ABNORMAL
UROBILINOGEN, URINE: NORMAL
WBC # BLD: 6.2 K/UL (ref 3.5–11.3)
WBC # BLD: ABNORMAL 10*3/UL
WBC UA: ABNORMAL /HPF (ref 0–5)
YEAST: ABNORMAL

## 2018-03-08 PROCEDURE — 5A1955Z RESPIRATORY VENTILATION, GREATER THAN 96 CONSECUTIVE HOURS: ICD-10-PCS | Performed by: INTERNAL MEDICINE

## 2018-03-08 PROCEDURE — 85025 COMPLETE CBC W/AUTO DIFF WBC: CPT

## 2018-03-08 PROCEDURE — 83735 ASSAY OF MAGNESIUM: CPT

## 2018-03-08 PROCEDURE — 94762 N-INVAS EAR/PLS OXIMTRY CONT: CPT

## 2018-03-08 PROCEDURE — 86738 MYCOPLASMA ANTIBODY: CPT

## 2018-03-08 PROCEDURE — 87205 SMEAR GRAM STAIN: CPT

## 2018-03-08 PROCEDURE — 2580000003 HC RX 258: Performed by: EMERGENCY MEDICINE

## 2018-03-08 PROCEDURE — 99291 CRITICAL CARE FIRST HOUR: CPT | Performed by: INTERNAL MEDICINE

## 2018-03-08 PROCEDURE — 87070 CULTURE OTHR SPECIMN AEROBIC: CPT

## 2018-03-08 PROCEDURE — 87804 INFLUENZA ASSAY W/OPTIC: CPT

## 2018-03-08 PROCEDURE — 87449 NOS EACH ORGANISM AG IA: CPT

## 2018-03-08 PROCEDURE — 2500000003 HC RX 250 WO HCPCS: Performed by: HOSPITALIST

## 2018-03-08 PROCEDURE — 2500000003 HC RX 250 WO HCPCS: Performed by: INTERNAL MEDICINE

## 2018-03-08 PROCEDURE — 2580000003 HC RX 258: Performed by: INTERNAL MEDICINE

## 2018-03-08 PROCEDURE — 94002 VENT MGMT INPAT INIT DAY: CPT

## 2018-03-08 PROCEDURE — 87040 BLOOD CULTURE FOR BACTERIA: CPT

## 2018-03-08 PROCEDURE — 94640 AIRWAY INHALATION TREATMENT: CPT

## 2018-03-08 PROCEDURE — 82330 ASSAY OF CALCIUM: CPT

## 2018-03-08 PROCEDURE — 36415 COLL VENOUS BLD VENIPUNCTURE: CPT

## 2018-03-08 PROCEDURE — 80048 BASIC METABOLIC PNL TOTAL CA: CPT

## 2018-03-08 PROCEDURE — 80053 COMPREHEN METABOLIC PANEL: CPT

## 2018-03-08 PROCEDURE — 81001 URINALYSIS AUTO W/SCOPE: CPT

## 2018-03-08 PROCEDURE — 93005 ELECTROCARDIOGRAM TRACING: CPT

## 2018-03-08 PROCEDURE — 2000000000 HC ICU R&B

## 2018-03-08 PROCEDURE — 87641 MR-STAPH DNA AMP PROBE: CPT

## 2018-03-08 PROCEDURE — 71045 X-RAY EXAM CHEST 1 VIEW: CPT

## 2018-03-08 PROCEDURE — 87086 URINE CULTURE/COLONY COUNT: CPT

## 2018-03-08 PROCEDURE — 82803 BLOOD GASES ANY COMBINATION: CPT

## 2018-03-08 PROCEDURE — 2580000003 HC RX 258: Performed by: HOSPITALIST

## 2018-03-08 PROCEDURE — 6360000002 HC RX W HCPCS: Performed by: HOSPITALIST

## 2018-03-08 PROCEDURE — 6370000000 HC RX 637 (ALT 250 FOR IP): Performed by: INTERNAL MEDICINE

## 2018-03-08 PROCEDURE — 85055 RETICULATED PLATELET ASSAY: CPT

## 2018-03-08 PROCEDURE — 6360000002 HC RX W HCPCS: Performed by: EMERGENCY MEDICINE

## 2018-03-08 PROCEDURE — 93970 EXTREMITY STUDY: CPT

## 2018-03-08 PROCEDURE — S0028 INJECTION, FAMOTIDINE, 20 MG: HCPCS | Performed by: HOSPITALIST

## 2018-03-08 PROCEDURE — 82947 ASSAY GLUCOSE BLOOD QUANT: CPT

## 2018-03-08 PROCEDURE — 83605 ASSAY OF LACTIC ACID: CPT

## 2018-03-08 PROCEDURE — 84100 ASSAY OF PHOSPHORUS: CPT

## 2018-03-08 RX ORDER — ALBUTEROL SULFATE 2.5 MG/3ML
2.5 SOLUTION RESPIRATORY (INHALATION)
Status: DISCONTINUED | OUTPATIENT
Start: 2018-03-08 | End: 2018-03-15 | Stop reason: HOSPADM

## 2018-03-08 RX ORDER — LORAZEPAM 2 MG/ML
1 INJECTION INTRAMUSCULAR EVERY 6 HOURS PRN
Status: DISCONTINUED | OUTPATIENT
Start: 2018-03-08 | End: 2018-03-11

## 2018-03-08 RX ORDER — MORPHINE SULFATE 4 MG/ML
4 INJECTION, SOLUTION INTRAMUSCULAR; INTRAVENOUS
Status: DISCONTINUED | OUTPATIENT
Start: 2018-03-08 | End: 2018-03-15 | Stop reason: HOSPADM

## 2018-03-08 RX ORDER — SODIUM CHLORIDE 0.9 % (FLUSH) 0.9 %
10 SYRINGE (ML) INJECTION EVERY 12 HOURS SCHEDULED
Status: DISCONTINUED | OUTPATIENT
Start: 2018-03-08 | End: 2018-03-15 | Stop reason: HOSPADM

## 2018-03-08 RX ORDER — 0.9 % SODIUM CHLORIDE 0.9 %
500 INTRAVENOUS SOLUTION INTRAVENOUS ONCE
Status: COMPLETED | OUTPATIENT
Start: 2018-03-08 | End: 2018-03-08

## 2018-03-08 RX ORDER — CLINDAMYCIN PHOSPHATE 600 MG/50ML
600 INJECTION INTRAVENOUS EVERY 8 HOURS
Status: DISCONTINUED | OUTPATIENT
Start: 2018-03-08 | End: 2018-03-08

## 2018-03-08 RX ORDER — IPRATROPIUM BROMIDE AND ALBUTEROL SULFATE 2.5; .5 MG/3ML; MG/3ML
1 SOLUTION RESPIRATORY (INHALATION)
Status: DISCONTINUED | OUTPATIENT
Start: 2018-03-08 | End: 2018-03-15 | Stop reason: HOSPADM

## 2018-03-08 RX ORDER — MORPHINE SULFATE 2 MG/ML
2 INJECTION, SOLUTION INTRAMUSCULAR; INTRAVENOUS
Status: DISCONTINUED | OUTPATIENT
Start: 2018-03-08 | End: 2018-03-15 | Stop reason: HOSPADM

## 2018-03-08 RX ORDER — METOPROLOL TARTRATE 5 MG/5ML
5 INJECTION INTRAVENOUS EVERY 6 HOURS PRN
Status: DISCONTINUED | OUTPATIENT
Start: 2018-03-08 | End: 2018-03-15 | Stop reason: HOSPADM

## 2018-03-08 RX ORDER — IPRATROPIUM BROMIDE AND ALBUTEROL SULFATE 2.5; .5 MG/3ML; MG/3ML
1 SOLUTION RESPIRATORY (INHALATION)
Status: DISCONTINUED | OUTPATIENT
Start: 2018-03-08 | End: 2018-03-08

## 2018-03-08 RX ORDER — ONDANSETRON 2 MG/ML
4 INJECTION INTRAMUSCULAR; INTRAVENOUS EVERY 6 HOURS PRN
Status: DISCONTINUED | OUTPATIENT
Start: 2018-03-08 | End: 2018-03-15 | Stop reason: HOSPADM

## 2018-03-08 RX ORDER — SODIUM CHLORIDE 0.9 % (FLUSH) 0.9 %
10 SYRINGE (ML) INJECTION PRN
Status: DISCONTINUED | OUTPATIENT
Start: 2018-03-08 | End: 2018-03-15 | Stop reason: HOSPADM

## 2018-03-08 RX ORDER — CHLORHEXIDINE GLUCONATE 0.12 MG/ML
15 RINSE ORAL 2 TIMES DAILY
Status: DISCONTINUED | OUTPATIENT
Start: 2018-03-08 | End: 2018-03-08

## 2018-03-08 RX ORDER — SODIUM CHLORIDE 9 MG/ML
INJECTION, SOLUTION INTRAVENOUS CONTINUOUS
Status: DISCONTINUED | OUTPATIENT
Start: 2018-03-08 | End: 2018-03-11

## 2018-03-08 RX ADMIN — SODIUM CHLORIDE: 9 INJECTION, SOLUTION INTRAVENOUS at 11:09

## 2018-03-08 RX ADMIN — SODIUM CHLORIDE 500 ML: 9 INJECTION, SOLUTION INTRAVENOUS at 18:44

## 2018-03-08 RX ADMIN — SODIUM CHLORIDE: 9 INJECTION, SOLUTION INTRAVENOUS at 23:39

## 2018-03-08 RX ADMIN — LORAZEPAM 1 MG: 2 INJECTION INTRAMUSCULAR; INTRAVENOUS at 16:21

## 2018-03-08 RX ADMIN — POTASSIUM PHOSPHATE, MONOBASIC AND POTASSIUM PHOSPHATE, DIBASIC 15 MMOL: 224; 236 INJECTION, SOLUTION, CONCENTRATE INTRAVENOUS at 21:40

## 2018-03-08 RX ADMIN — MEROPENEM 1 G: 1 INJECTION, POWDER, FOR SOLUTION INTRAVENOUS at 11:44

## 2018-03-08 RX ADMIN — Medication 10 ML: at 21:26

## 2018-03-08 RX ADMIN — SODIUM CHLORIDE 500 ML: 9 INJECTION, SOLUTION INTRAVENOUS at 09:27

## 2018-03-08 RX ADMIN — SODIUM CHLORIDE 500 ML: 9 INJECTION, SOLUTION INTRAVENOUS at 20:30

## 2018-03-08 RX ADMIN — SODIUM CHLORIDE: 9 INJECTION, SOLUTION INTRAVENOUS at 08:12

## 2018-03-08 RX ADMIN — MICONAZORB AF ANTIFUNGAL POWDER: 1.42 POWDER TOPICAL at 22:57

## 2018-03-08 RX ADMIN — SODIUM CHLORIDE 250 ML: 9 INJECTION, SOLUTION INTRAVENOUS at 11:04

## 2018-03-08 RX ADMIN — FAMOTIDINE 20 MG: 10 INJECTION INTRAVENOUS at 21:22

## 2018-03-08 RX ADMIN — ENOXAPARIN SODIUM 40 MG: 40 INJECTION SUBCUTANEOUS at 11:06

## 2018-03-08 RX ADMIN — IPRATROPIUM BROMIDE AND ALBUTEROL SULFATE 1 AMPULE: .5; 3 SOLUTION RESPIRATORY (INHALATION) at 16:14

## 2018-03-08 RX ADMIN — SODIUM CHLORIDE: 9 INJECTION, SOLUTION INTRAVENOUS at 21:17

## 2018-03-08 RX ADMIN — ENOXAPARIN SODIUM 30 MG: 30 INJECTION SUBCUTANEOUS at 21:22

## 2018-03-08 RX ADMIN — IPRATROPIUM BROMIDE AND ALBUTEROL SULFATE 1 AMPULE: .5; 3 SOLUTION RESPIRATORY (INHALATION) at 20:05

## 2018-03-08 RX ADMIN — IPRATROPIUM BROMIDE AND ALBUTEROL SULFATE 1 AMPULE: .5; 3 SOLUTION RESPIRATORY (INHALATION) at 11:08

## 2018-03-08 RX ADMIN — Medication 1250 MG: at 16:15

## 2018-03-08 RX ADMIN — SODIUM CHLORIDE 500 ML: 9 INJECTION, SOLUTION INTRAVENOUS at 15:34

## 2018-03-08 RX ADMIN — MEROPENEM 1 G: 1 INJECTION, POWDER, FOR SOLUTION INTRAVENOUS at 18:40

## 2018-03-08 RX ADMIN — SODIUM CHLORIDE: 9 INJECTION, SOLUTION INTRAVENOUS at 15:35

## 2018-03-08 RX ADMIN — MICONAZORB AF ANTIFUNGAL POWDER: 1.42 POWDER TOPICAL at 11:44

## 2018-03-08 RX ADMIN — FAMOTIDINE 20 MG: 10 INJECTION INTRAVENOUS at 09:21

## 2018-03-08 RX ADMIN — Medication 10 ML: at 08:13

## 2018-03-08 RX ADMIN — SODIUM CHLORIDE 500 ML: 9 INJECTION, SOLUTION INTRAVENOUS at 22:30

## 2018-03-08 ASSESSMENT — PULMONARY FUNCTION TESTS
PIF_VALUE: 20
PIF_VALUE: 21
PIF_VALUE: 17
PIF_VALUE: 24
PIF_VALUE: 26
PIF_VALUE: 19

## 2018-03-08 NOTE — H&P
Lab Results   Component Value Date    TSH 1.62 03/05/2018        Urinalysis:     Microbiology:  Cultures during this admission:     Blood cultures:                 [] None drawn      [] Negative             []  Positive (Details:  )  Urine Culture:                   [] None drawn      [] Negative             []  Positive (Details:  )  Sputum Culture:               [] None drawn       [] Negative             []  Positive (Details:  )   Endotracheal aspirate:     [] None drawn       [] Negative             []  Positive (Details:  )         -----------------------------------------------------------------  Radiological reports:    CXR:     Impression   Suboptimal positioning.  Bilateral interstitial and alveolar opacities are   noted along with small effusions.  These findings are most consistent with   edema however an underlying inflammatory process or infection should also be   considered in the appropriate clinical setting.  These findings have   progressed since the most recent exam.       Last Echocardiogram findings:   CONCLUSIONS    Summary  Echo contrast utilized on this technically difficult study. Patient supine, on ventilator. Very poor acoustic windows and poor transmission  Likely, normal left ventricular ejection fraction (>55%). Unable to measure LV dimensions. No significant valvular regurgitation or stenosis seen.        Assessment and Plan     Patient Active Problem List   Diagnosis    Lower urinary tract infectious disease    Respiratory failure ([address]    NSTEMI (non-ST elevated myocardial infarction) ([address]    Cellulitis of leg, left    CHF (congestive heart failure) (HCC)    Metabolic Alkalosis secondary chronic respiratory failure    Pneumonia    Hypoxemia    Tracheostomy obstruction (HCC)    Acute respiratory failure ([address]         Additional assessment:  Principal Problem:    Acute respiratory failure ([address]  Active Problems:    Cellulitis of leg, left    Metabolic Alkalosis

## 2018-03-08 NOTE — PROGRESS NOTES
Pharmacy Note  Vancomycin Consult    Bridget Nasir Elías Griffin is a 59 y.o. female started on Vancomycin for possible pneumonia & cellulitis; consult received from Dr. Robert Torres to manage therapy. Also receiving the following antibiotics: meropenem. Patient Active Problem List   Diagnosis    Lower urinary tract infectious disease    [facility]    NSTEMI (non-ST elevated myocardial infarction) [facility]    Cellulitis of leg, left    [facility]cong[facility]) [facility]    Metabolic Alkalosis secondary chronic respiratory failure    Aspiration pneumonia [facility]    Hypoxemia    Tracheostomy obstruction ([facility])    Acute respiratory failure ([facility]    [facility] present ([facility]    Atrial fibrillation ([facility]       Allergies:  Mobic [meloxicam]; Pcn [penicillins]; and Risperidone     Temp max: 38.2 C    Recent Labs      18   0853   BUN  15       Recent Labs      18   0853   CREATININE  0.63       Recent Labs      18   0853   WBC  6.2         Intake/Output Summary (Last 24 hours) at 18 1044  Last data filed at 18 0815   Gross per 24 hour   Intake 0 ml   Output 50 ml   Net -50 ml       Culture Date      Source                       Results  3/8                      Blood                           Pending    Ht Readings from Last 1 Encounters:   18 5' 4\" (1.626 m)        Wt Readings from Last 1 Encounters:   18 297 lb 6.4 oz (134.9 kg)         Body mass index is 51.05 kg/m². Estimated Creatinine Clearance: 127 mL/min (based on SCr of 0.63 mg/dL). Estimated CrCl using IBW of 54.7 k mL/min    Assessment/Plan:  Patient received vancomycin loading dose of 2744 mg x 1 today at 0215 prior to transfer from [facility]. Will continue vancomycin 1250 mg IV every 8 hours beginning this afternoon. Timing of trough level will be determined based on culture results, renal function, and clinical response. Thank you for the consult. Will continue to follow.

## 2018-03-09 LAB
ABSOLUTE EOS #: 0.05 K/UL (ref 0–0.4)
ABSOLUTE IMMATURE GRANULOCYTE: 0.05 K/UL (ref 0–0.3)
ABSOLUTE LYMPH #: 0.32 K/UL (ref 1–4.8)
ABSOLUTE MONO #: 0.32 K/UL (ref 0.1–0.8)
ALBUMIN SERPL-MCNC: 3 G/DL (ref 3.5–5.2)
ALBUMIN/GLOBULIN RATIO: 1 (ref 1–2.5)
ALP BLD-CCNC: 49 U/L (ref 35–104)
ALT SERPL-CCNC: 9 U/L (ref 5–33)
ANION GAP SERPL CALCULATED.3IONS-SCNC: 5 MMOL/L (ref 9–17)
AST SERPL-CCNC: 14 U/L
BASOPHILS # BLD: 0 % (ref 0–2)
BASOPHILS ABSOLUTE: 0 K/UL (ref 0–0.2)
BILIRUB SERPL-MCNC: 0.42 MG/DL (ref 0.3–1.2)
BUN BLDV-MCNC: 14 MG/DL (ref 8–23)
BUN/CREAT BLD: ABNORMAL (ref 9–20)
CALCIUM SERPL-MCNC: 8.6 MG/DL (ref 8.6–10.4)
CHLORIDE BLD-SCNC: 101 MMOL/L (ref 98–107)
CO2: 37 MMOL/L (ref 20–31)
CREAT SERPL-MCNC: 0.48 MG/DL (ref 0.5–0.9)
CULTURE: NO GROWTH
CULTURE: NORMAL
DIFFERENTIAL TYPE: ABNORMAL
EOSINOPHILS RELATIVE PERCENT: 1 % (ref 1–4)
GFR AFRICAN AMERICAN: >60 ML/MIN
GFR NON-AFRICAN AMERICAN: >60 ML/MIN
GFR SERPL CREATININE-BSD FRML MDRD: ABNORMAL ML/MIN/{1.73_M2}
GFR SERPL CREATININE-BSD FRML MDRD: ABNORMAL ML/MIN/{1.73_M2}
GLUCOSE BLD-MCNC: 96 MG/DL (ref 70–99)
HCT VFR BLD CALC: 31.7 % (ref 36.3–47.1)
HEMOGLOBIN: 8.9 G/DL (ref 11.9–15.1)
IMMATURE GRANULOCYTES: 1 %
LYMPHOCYTES # BLD: 7 % (ref 24–44)
Lab: NORMAL
MCH RBC QN AUTO: 23.7 PG (ref 25.2–33.5)
MCHC RBC AUTO-ENTMCNC: 28.1 G/DL (ref 28.4–34.8)
MCV RBC AUTO: 84.5 FL (ref 82.6–102.9)
MONOCYTES # BLD: 7 % (ref 1–7)
MORPHOLOGY: ABNORMAL
MYCOPLASMA PNEUMONIAE IGM: 0.3
NRBC AUTOMATED: 0 PER 100 WBC
PDW BLD-RTO: 17 % (ref 11.8–14.4)
PLATELET # BLD: ABNORMAL K/UL (ref 138–453)
PLATELET ESTIMATE: ABNORMAL
PLATELET, FLUORESCENCE: 107 K/UL (ref 138–453)
PLATELET, IMMATURE FRACTION: 5.3 % (ref 1.1–10.3)
PMV BLD AUTO: ABNORMAL FL (ref 8.1–13.5)
POTASSIUM SERPL-SCNC: 3.9 MMOL/L (ref 3.7–5.3)
RBC # BLD: 3.75 M/UL (ref 3.95–5.11)
RBC # BLD: ABNORMAL 10*6/UL
SEG NEUTROPHILS: 84 % (ref 36–66)
SEGMENTED NEUTROPHILS ABSOLUTE COUNT: 3.76 K/UL (ref 1.8–7.7)
SODIUM BLD-SCNC: 143 MMOL/L (ref 135–144)
SPECIMEN DESCRIPTION: NORMAL
STATUS: NORMAL
TOTAL PROTEIN: 6 G/DL (ref 6.4–8.3)
WBC # BLD: 4.5 K/UL (ref 3.5–11.3)
WBC # BLD: ABNORMAL 10*3/UL

## 2018-03-09 PROCEDURE — 6360000002 HC RX W HCPCS: Performed by: HOSPITALIST

## 2018-03-09 PROCEDURE — 6370000000 HC RX 637 (ALT 250 FOR IP): Performed by: INTERNAL MEDICINE

## 2018-03-09 PROCEDURE — 6360000002 HC RX W HCPCS: Performed by: EMERGENCY MEDICINE

## 2018-03-09 PROCEDURE — 94770 HC ETCO2 MONITOR DAILY: CPT

## 2018-03-09 PROCEDURE — S0028 INJECTION, FAMOTIDINE, 20 MG: HCPCS | Performed by: HOSPITALIST

## 2018-03-09 PROCEDURE — 85055 RETICULATED PLATELET ASSAY: CPT

## 2018-03-09 PROCEDURE — G8996 SWALLOW CURRENT STATUS: HCPCS

## 2018-03-09 PROCEDURE — 6370000000 HC RX 637 (ALT 250 FOR IP): Performed by: EMERGENCY MEDICINE

## 2018-03-09 PROCEDURE — 2500000003 HC RX 250 WO HCPCS: Performed by: HOSPITALIST

## 2018-03-09 PROCEDURE — 94003 VENT MGMT INPAT SUBQ DAY: CPT

## 2018-03-09 PROCEDURE — 2000000000 HC ICU R&B

## 2018-03-09 PROCEDURE — 36415 COLL VENOUS BLD VENIPUNCTURE: CPT

## 2018-03-09 PROCEDURE — 80053 COMPREHEN METABOLIC PANEL: CPT

## 2018-03-09 PROCEDURE — 94640 AIRWAY INHALATION TREATMENT: CPT

## 2018-03-09 PROCEDURE — 85025 COMPLETE CBC W/AUTO DIFF WBC: CPT

## 2018-03-09 PROCEDURE — 2580000003 HC RX 258: Performed by: HOSPITALIST

## 2018-03-09 PROCEDURE — 92610 EVALUATE SWALLOWING FUNCTION: CPT

## 2018-03-09 PROCEDURE — 99291 CRITICAL CARE FIRST HOUR: CPT | Performed by: INTERNAL MEDICINE

## 2018-03-09 PROCEDURE — 94762 N-INVAS EAR/PLS OXIMTRY CONT: CPT

## 2018-03-09 PROCEDURE — G8997 SWALLOW GOAL STATUS: HCPCS

## 2018-03-09 RX ORDER — UREA 10 %
3 LOTION (ML) TOPICAL NIGHTLY PRN
Status: DISCONTINUED | OUTPATIENT
Start: 2018-03-09 | End: 2018-03-15 | Stop reason: HOSPADM

## 2018-03-09 RX ORDER — QUETIAPINE FUMARATE 25 MG/1
25 TABLET, FILM COATED ORAL NIGHTLY PRN
Status: DISCONTINUED | OUTPATIENT
Start: 2018-03-09 | End: 2018-03-15 | Stop reason: HOSPADM

## 2018-03-09 RX ORDER — ECHINACEA PURPUREA EXTRACT 125 MG
1 TABLET ORAL PRN
Status: DISCONTINUED | OUTPATIENT
Start: 2018-03-09 | End: 2018-03-15 | Stop reason: HOSPADM

## 2018-03-09 RX ADMIN — MICONAZORB AF ANTIFUNGAL POWDER: 1.42 POWDER TOPICAL at 08:39

## 2018-03-09 RX ADMIN — MEROPENEM 1 G: 1 INJECTION, POWDER, FOR SOLUTION INTRAVENOUS at 10:27

## 2018-03-09 RX ADMIN — Medication 1250 MG: at 08:27

## 2018-03-09 RX ADMIN — Medication 3 MG: at 23:52

## 2018-03-09 RX ADMIN — Medication 1250 MG: at 00:50

## 2018-03-09 RX ADMIN — FAMOTIDINE 20 MG: 10 INJECTION INTRAVENOUS at 08:39

## 2018-03-09 RX ADMIN — IPRATROPIUM BROMIDE AND ALBUTEROL SULFATE 1 AMPULE: .5; 3 SOLUTION RESPIRATORY (INHALATION) at 09:03

## 2018-03-09 RX ADMIN — QUETIAPINE FUMARATE 25 MG: 25 TABLET ORAL at 23:52

## 2018-03-09 RX ADMIN — LORAZEPAM 1 MG: 2 INJECTION INTRAMUSCULAR; INTRAVENOUS at 00:55

## 2018-03-09 RX ADMIN — Medication 1250 MG: at 16:37

## 2018-03-09 RX ADMIN — Medication 1250 MG: at 23:34

## 2018-03-09 RX ADMIN — SALINE NASAL SPRAY 1 SPRAY: 1.5 SOLUTION NASAL at 05:12

## 2018-03-09 RX ADMIN — MEROPENEM 1 G: 1 INJECTION, POWDER, FOR SOLUTION INTRAVENOUS at 00:25

## 2018-03-09 RX ADMIN — IPRATROPIUM BROMIDE AND ALBUTEROL SULFATE 1 AMPULE: .5; 3 SOLUTION RESPIRATORY (INHALATION) at 20:05

## 2018-03-09 RX ADMIN — IPRATROPIUM BROMIDE AND ALBUTEROL SULFATE 1 AMPULE: .5; 3 SOLUTION RESPIRATORY (INHALATION) at 14:37

## 2018-03-09 RX ADMIN — SALINE NASAL SPRAY 1 SPRAY: 1.5 SOLUTION NASAL at 17:42

## 2018-03-09 RX ADMIN — LORAZEPAM 1 MG: 2 INJECTION INTRAMUSCULAR; INTRAVENOUS at 20:30

## 2018-03-09 RX ADMIN — ALBUTEROL SULFATE 2.5 MG: 2.5 SOLUTION RESPIRATORY (INHALATION) at 23:36

## 2018-03-09 RX ADMIN — MEROPENEM 1 G: 1 INJECTION, POWDER, FOR SOLUTION INTRAVENOUS at 16:37

## 2018-03-09 RX ADMIN — LORAZEPAM 1 MG: 2 INJECTION INTRAMUSCULAR; INTRAVENOUS at 09:03

## 2018-03-09 RX ADMIN — SODIUM CHLORIDE: 9 INJECTION, SOLUTION INTRAVENOUS at 21:55

## 2018-03-09 RX ADMIN — FAMOTIDINE 20 MG: 10 INJECTION INTRAVENOUS at 20:31

## 2018-03-09 RX ADMIN — MICONAZORB AF ANTIFUNGAL POWDER: 1.42 POWDER TOPICAL at 20:30

## 2018-03-09 ASSESSMENT — PULMONARY FUNCTION TESTS
PIF_VALUE: 26
PIF_VALUE: 31
PIF_VALUE: 28
PIF_VALUE: 27
PIF_VALUE: 12
PIF_VALUE: 25
PIF_VALUE: 27
PIF_VALUE: 27
PIF_VALUE: 24
PIF_VALUE: 32

## 2018-03-09 ASSESSMENT — PAIN SCALES - GENERAL
PAINLEVEL_OUTOF10: 0
PAINLEVEL_OUTOF10: 0

## 2018-03-09 NOTE — PROGRESS NOTES
93/43 - - 55 20 97 % -         Intake/Output Summary (Last 24 hours) at 03/09/18 0722  Last data filed at 03/09/18 0600   Gross per 24 hour   Intake          4888.68 ml   Output              535 ml   Net          4353.68 ml       Date 03/09/18 0000 - 03/09/18 2359   Shift 9216-0026 0160-1490 7632-2376 24 Hour Total   I  N  T  A  K  E   I.V.  (mL/kg) 1516.7  (10.9)   1516.7  (10.9)    IV Piggyback  (mL/kg) 236  (1.7)   236  (1.7)    Shift Total  (mL/kg) 1752.7  (12.6)   1752.7  (12.6)   O  U  T  P  U  T   Urine  (mL/kg/hr) 185   185    Shift Total  (mL/kg) 185  (1.3)   185  (1.3)   Weight (kg) 139.4 139.4 139.4 139.4     Wt Readings from Last 3 Encounters:   03/09/18 (!) 307 lb 5.1 oz (139.4 kg)   01/08/18 290 lb (131.5 kg)   12/22/13 (!) 327 lb 6.1 oz (148.5 kg)     Body mass index is 52.75 kg/m². PHYSICAL EXAM:  GEN:  awake, alert, cooperative, no apparent distress, and appears stated age  HEENT:  tracheostomy tube in place. Normocephalic, without obvious abnormality, atramatic, sinuses nontender on palpation, external ears without lesions, oral pharynx with moist mucus membranes, tonsils without erythema or exudates, gums normal and good dentition. LUNGS:  No increased work of breathing, good air exchange, clear to auscultation bilaterally, no crackles or wheezing  CV:    Normal apical impulse, regular rate and rhythm, normal S1 and S2, no S3 or S4, and no murmur noted  ABDOMEN:   No scars, normal bowel sounds, soft, non-distended, non-tender, no masses palpated, no hepatosplenomegaly. Large umbilical hernia. MSK:    There is no redness, warmth, or swelling of the joints. Full range of motion noted. Motor strength is 5 out of 5 all extremities bilaterally. Tone is normal.  NEURO[de-identified]   Awake, alert, oriented to name, place and time. Cranial nerves II-XII are grossly intact. Motor is 5 out of 5 bilaterally. Cerebellar finger to nose, heel to shin intact. Sensory is intact.   Babinski down going, Romberg negative, and gait is normal.  SKIN:   rash present under breast.  EXTREMITIES:  LE erythema more so on left leg appears to be improving, no calf tenderness/swelling, no erythema, distal pulses intact       MEDICATIONS:  Scheduled Meds:   sodium chloride flush  10 mL Intravenous 2 times per day    famotidine (PEPCID) injection  20 mg Intravenous BID    meropenem  1 g Intravenous Q8H    miconazole   Topical BID    ipratropium-albuterol  1 ampule Inhalation Q6H WA    vancomycin (VANCOCIN) intermittent dosing (placeholder)   Other RX Placeholder    vancomycin  1,250 mg Intravenous Q8H    enoxaparin  30 mg Subcutaneous BID     Continuous Infusions:   sodium chloride 75 mL/hr at 03/08/18 2339     PRN Meds:     sodium chloride 1 spray PRN   sodium chloride flush 10 mL PRN   magnesium hydroxide 30 mL Daily PRN   ondansetron 4 mg Q6H PRN   albuterol 2.5 mg Q2H PRN   metoprolol 5 mg Q6H PRN   LORazepam 1 mg Q6H PRN   morphine 2 mg Q2H PRN   Or     morphine 4 mg Q2H PRN       SUPPORT DEVICES: [] Ventilator [] BIPAP  [] Nasal Cannula [] Room Air    VENT SETTINGS (Comprehensive) (if applicable):  Vent Information  Vent Mode: PRVC/AC  Vt Ordered: 450 mL  Vt Exhaled: 365 mL  Rate Set: 16 bmp  Rate Measured: 19 br/min  Minute Volume: 7.4 Liters  FiO2 : 40 %  Peak Inspiratory Pressure: 24 cmH2O  I:E Ratio: 1:2.44  Sensitivity: 5  PEEP/CPAP: 5  I Time/ I Time %: 0.9 s  Mean Airway Pressure: 11 cmH20  Plateau Pressure:  (uto)  Static Compliance:  (uto)  Dynamic Compliance:  (uto)  Total PEEP:  (uto)  Additional Respiratory  Assessments  Pulse: 70  Resp: 21  SpO2: 98 %  End Tidal CO2: 64 (%)  Position: Semi-Fiore's  HME (Heat moisture exchanger): Yes  Oral Care Completed?: Yes  Oral Care: Mouth suctioned, Mouth moisturizer, Mouth swabbed    ABGs:   Arterial Blood Gas result:  pH 7.419; pCO2 65.3; pO2 85.5; HCO3 42.3; BE; %O2 Sat 98.   Lab Results   Component Value Date    PHART 7.381 01/09/2018    WGZ2VIJ 63.6 03/09/2018    LYMPHOPCT 7 03/09/2018    MONOPCT 7 03/09/2018    BASOPCT 0 03/09/2018    MONOSABS 0.32 03/09/2018    LYMPHSABS 0.32 03/09/2018    EOSABS 0.05 03/09/2018    BASOSABS 0.00 03/09/2018    DIFFTYPE NOT REPORTED 03/09/2018     BMP:    Lab Results   Component Value Date     03/09/2018    K 3.9 03/09/2018     03/09/2018    CO2 37 03/09/2018    BUN 14 03/09/2018    LABALBU 3.0 03/09/2018    CREATININE 0.48 03/09/2018    CALCIUM 8.6 03/09/2018    GFRAA >60 03/09/2018    LABGLOM >60 03/09/2018    GLUCOSE 96 03/09/2018     Hepatic Function Panel:    Lab Results   Component Value Date    ALKPHOS 49 03/09/2018    ALT 9 03/09/2018    AST 14 03/09/2018    PROT 6.0 03/09/2018    BILITOT 0.42 03/09/2018    BILIDIR <0.08 09/13/2017    IBILI CANNOT BE CALCULATED 09/13/2017    LABALBU 3.0 03/09/2018     PT/INR:    Lab Results   Component Value Date    PROTIME 11.4 10/08/2016    INR 1.1 10/08/2016     Last 3 Troponin:    Lab Results   Component Value Date    TROPONINI 0.07 12/09/2013    TROPONINI 0.12 12/09/2013    TROPONINI 0.15 12/09/2013         Radiology/Imaging:  Impression:        Suboptimal positioning.  Bilateral interstitial and alveolar opacities are  noted along with small effusions.  These findings are most consistent with  edema however an underlying inflammatory process or infection should also be  considered in the appropriate clinical setting.  These findings have  progressed since the most recent exam.         ASSESSMENT:     Patient Active Problem List    Diagnosis Date Noted    Bibasilar pneumonia - aspiration vs HCAP 03/08/2018    Acute respiratory failure with hypercapnia ([address] 03/08/2018    Tracheostomy present ([address] 03/08/2018    Chronic Atrial Fibrillation 03/08/2018    Urinary tract infection with hematuria 03/08/2018    Bipolar disorder ([address] 03/08/2018    Hyperglycemia 03/08/2018    Tracheostomy obstruction ([address]     Hypoxemia 17/94/9336    Metabolic Alkalosis secondary chronic respiratory failure 12/13/2013    Respiratory failure ([facility] 12/09/2013    NSTEMI (non-ST elevated myocardial infarction) ([facility] 12/09/2013    Cellulitis of leg, left 12/09/2013    CHF (congestive heart failure) ([facility] 12/09/2013          PLAN:     WEAN PER PROTOCOL:  [x] No   [] Yes  [] N/A    ICU PROPHYLAXIS:  Stress ulcer:  [] PPI Agent  [x] P8Hsizz [] Sucralfate  [] Other:  VTE:   [x] Enoxaparin  [] Unfract. Heparin Subcut  [] EPC Cuffs    NUTRITION:  [x] NPO [] Tube Feeding (Specify: ) [] TPN  [] PO    HOME MEDS RECONCILED: [x] No  [] Yes    CONSULTATION NEEDED:  [x] No  [] Yes    FAMILY UPDATED:    [] No  [] Yes    TRANSFER OUT OF ICU:   [x] No  [] Yes        Additional Assessment:  Principal Problem:    Acute respiratory failure with hypercapnia (HCC)  Active Problems:    Cellulitis of leg, left    Metabolic Alkalosis secondary chronic respiratory failure    Bibasilar pneumonia - aspiration vs HCAP    Tracheostomy present (HCC)    Chronic Atrial Fibrillation    Urinary tract infection with hematuria    Bipolar disorder ([facility]    Hyperglycemia  Resolved Problems:    * No resolved hospital problems. *      Plan:  Cont IV fluids  Cont meropenem and vancomycin   Cultures pending. LE dopplers negative.   Cont breathing treatments  To be weaned off vent and switched to O2  Aspiration precautions  Speech to evaluate        Cherelle Stack MD  Internal Medicine Resident PGY2  [address], [place]  3/9/2018 7:22 AM

## 2018-03-09 NOTE — PROGRESS NOTES
possible for all oral intake;Eat/Feed slowly; No straws;Small bites/sips; External pacing;Remain upright for 30-45 minutes after meals    Treatment/Goals  Dysphagia Goals: The patient will tolerate recommended diet without observed clinical signs of aspiration, The patient will tolerate MBS procedure. General  Chart Reviewed: Yes  Behavior/Cognition: Alert; Cooperative  Respiratory Status: Trach cuff  Communication Observation: Non-verbal (able to mouth words)  Follows Directions: Simple  Dentition: Adequate  Patient Positioning: Upright in bed (70 degrees)  Baseline Vocal Quality: Aphonic  Volitional Cough: Strong (with suction)  Consistencies Administered: Puree; Honey - teaspoon (mixed with blue food coloring)      Oral Motor Deficits  Oral/Motor  Oral Motor: Within functional limits    Oral Phase Dysfunction  Oral Phase  Oral Phase: WFL  Oral Phase  Oral Phase - Comment: Funcational for small bites of honey think and puree via tsp     Indicators of Pharyngeal Phase Dysfunction   Pharyngeal Phase  Pharyngeal Phase: Exceptions  Pharyngeal Phase   Pharyngeal: Pt. given Puree and Honey thick trials mixed with blue food coloring. Pt. on vent with # 6 trach cuff inflated. Pt. with no return of blue noted upon immediate suction indicating no immediate aspiration of cosnsitencies tested. Will need to suction up to 6 hours to assess for latent aspiration. If no blue noted latent recommend Pt. start a Puree and Honey thick diet. If blue noted latent recommend pt. reamina NPO at that time. Should physicain wich to advnace diet past Puree with honey thick, Recommend MBSS completion. Prognosis  Prognosis  Prognosis for safe diet advancement: fair    Education  Patient Education: yes  Patient Education Response: Verbalizes understanding      G-Code  SLP G-Codes  Functional Limitations: Swallowing  Swallow Current Status ():  At least 60 percent but less than 80 percent impaired, limited or restricted  Swallow Goal

## 2018-03-10 LAB
ABSOLUTE EOS #: 0.09 K/UL (ref 0–0.44)
ABSOLUTE EOS #: 0.12 K/UL (ref 0–0.44)
ABSOLUTE IMMATURE GRANULOCYTE: 0.04 K/UL (ref 0–0.3)
ABSOLUTE IMMATURE GRANULOCYTE: 0.06 K/UL (ref 0–0.3)
ABSOLUTE LYMPH #: 0.53 K/UL (ref 1.1–3.7)
ABSOLUTE LYMPH #: 0.54 K/UL (ref 1.1–3.7)
ABSOLUTE MONO #: 0.44 K/UL (ref 0.1–1.2)
ABSOLUTE MONO #: 0.72 K/UL (ref 0.1–1.2)
ABSOLUTE RETIC #: 0.04 M/UL (ref 0.03–0.08)
ALBUMIN SERPL-MCNC: 2.3 G/DL (ref 3.5–5.2)
ALBUMIN/GLOBULIN RATIO: 0.9 (ref 1–2.5)
ALP BLD-CCNC: 41 U/L (ref 35–104)
ALT SERPL-CCNC: 8 U/L (ref 5–33)
ANION GAP SERPL CALCULATED.3IONS-SCNC: 5 MMOL/L (ref 9–17)
AST SERPL-CCNC: 14 U/L
BASOPHILS # BLD: 0 % (ref 0–2)
BASOPHILS # BLD: 0 % (ref 0–2)
BASOPHILS ABSOLUTE: 0 K/UL (ref 0–0.2)
BASOPHILS ABSOLUTE: 0 K/UL (ref 0–0.2)
BILIRUB SERPL-MCNC: 0.28 MG/DL (ref 0.3–1.2)
BUN BLDV-MCNC: 8 MG/DL (ref 8–23)
BUN/CREAT BLD: ABNORMAL (ref 9–20)
CALCIUM SERPL-MCNC: 7.2 MG/DL (ref 8.6–10.4)
CHLORIDE BLD-SCNC: 104 MMOL/L (ref 98–107)
CO2: 32 MMOL/L (ref 20–31)
CREAT SERPL-MCNC: 0.33 MG/DL (ref 0.5–0.9)
CULTURE: ABNORMAL
CULTURE: ABNORMAL
DIFFERENTIAL TYPE: ABNORMAL
DIFFERENTIAL TYPE: ABNORMAL
DIRECT EXAM: ABNORMAL
EOSINOPHILS RELATIVE PERCENT: 2 % (ref 1–4)
EOSINOPHILS RELATIVE PERCENT: 2 % (ref 1–4)
FERRITIN: 90 UG/L (ref 13–150)
GFR AFRICAN AMERICAN: >60 ML/MIN
GFR NON-AFRICAN AMERICAN: >60 ML/MIN
GFR SERPL CREATININE-BSD FRML MDRD: ABNORMAL ML/MIN/{1.73_M2}
GFR SERPL CREATININE-BSD FRML MDRD: ABNORMAL ML/MIN/{1.73_M2}
GLUCOSE BLD-MCNC: 101 MG/DL (ref 65–105)
GLUCOSE BLD-MCNC: 94 MG/DL (ref 70–99)
HCT VFR BLD CALC: 27.5 % (ref 36.3–47.1)
HCT VFR BLD CALC: 33.3 % (ref 36.3–47.1)
HEMOGLOBIN: 7.4 G/DL (ref 11.9–15.1)
HEMOGLOBIN: 9.2 G/DL (ref 11.9–15.1)
IMMATURE GRANULOCYTES: 1 %
IMMATURE GRANULOCYTES: 1 %
IMMATURE RETIC FRACT: 7.3 % (ref 2.7–18.3)
IRON SATURATION: 6 % (ref 20–55)
IRON: 12 UG/DL (ref 37–145)
LYMPHOCYTES # BLD: 12 % (ref 24–43)
LYMPHOCYTES # BLD: 9 % (ref 24–43)
Lab: ABNORMAL
MCH RBC QN AUTO: 23.3 PG (ref 25.2–33.5)
MCH RBC QN AUTO: 24 PG (ref 25.2–33.5)
MCHC RBC AUTO-ENTMCNC: 27 G/DL (ref 28.4–34.8)
MCHC RBC AUTO-ENTMCNC: 27.6 G/DL (ref 28.4–34.8)
MCV RBC AUTO: 86.5 FL (ref 82.6–102.9)
MCV RBC AUTO: 86.9 FL (ref 82.6–102.9)
MONOCYTES # BLD: 10 % (ref 3–12)
MONOCYTES # BLD: 12 % (ref 3–12)
MORPHOLOGY: ABNORMAL
MORPHOLOGY: ABNORMAL
NRBC AUTOMATED: 0 PER 100 WBC
NRBC AUTOMATED: 0 PER 100 WBC
PDW BLD-RTO: 16.9 % (ref 11.8–14.4)
PDW BLD-RTO: 17.1 % (ref 11.8–14.4)
PLATELET # BLD: ABNORMAL K/UL (ref 138–453)
PLATELET # BLD: ABNORMAL K/UL (ref 138–453)
PLATELET ESTIMATE: ABNORMAL
PLATELET ESTIMATE: ABNORMAL
PLATELET, FLUORESCENCE: 104 K/UL (ref 138–453)
PLATELET, FLUORESCENCE: 94 K/UL (ref 138–453)
PLATELET, IMMATURE FRACTION: 5.2 % (ref 1.1–10.3)
PLATELET, IMMATURE FRACTION: 5.8 % (ref 1.1–10.3)
PMV BLD AUTO: ABNORMAL FL (ref 8.1–13.5)
PMV BLD AUTO: ABNORMAL FL (ref 8.1–13.5)
POTASSIUM SERPL-SCNC: 3.6 MMOL/L (ref 3.7–5.3)
RBC # BLD: 3.18 M/UL (ref 3.95–5.11)
RBC # BLD: 3.83 M/UL (ref 3.95–5.11)
RBC # BLD: ABNORMAL 10*6/UL
RBC # BLD: ABNORMAL 10*6/UL
RETIC %: 1.2 % (ref 0.5–1.9)
RETIC HEMOGLOBIN: 20.9 PG (ref 28.2–35.7)
SEG NEUTROPHILS: 75 % (ref 36–65)
SEG NEUTROPHILS: 76 % (ref 36–65)
SEGMENTED NEUTROPHILS ABSOLUTE COUNT: 3.3 K/UL (ref 1.5–8.1)
SEGMENTED NEUTROPHILS ABSOLUTE COUNT: 4.56 K/UL (ref 1.5–8.1)
SODIUM BLD-SCNC: 141 MMOL/L (ref 135–144)
SPECIMEN DESCRIPTION: ABNORMAL
STATUS: ABNORMAL
TOTAL IRON BINDING CAPACITY: 203 UG/DL (ref 250–450)
TOTAL PROTEIN: 4.9 G/DL (ref 6.4–8.3)
UNSATURATED IRON BINDING CAPACITY: 191 UG/DL (ref 112–347)
VANCOMYCIN TROUGH DATE LAST DOSE: ABNORMAL
VANCOMYCIN TROUGH DOSE AMOUNT: ABNORMAL
VANCOMYCIN TROUGH TIME LAST DOSE: ABNORMAL
VANCOMYCIN TROUGH: 20.6 UG/ML (ref 10–20)
WBC # BLD: 4.4 K/UL (ref 3.5–11.3)
WBC # BLD: 6 K/UL (ref 3.5–11.3)
WBC # BLD: ABNORMAL 10*3/UL
WBC # BLD: ABNORMAL 10*3/UL

## 2018-03-10 PROCEDURE — 2500000003 HC RX 250 WO HCPCS: Performed by: HOSPITALIST

## 2018-03-10 PROCEDURE — 83540 ASSAY OF IRON: CPT

## 2018-03-10 PROCEDURE — 6370000000 HC RX 637 (ALT 250 FOR IP): Performed by: EMERGENCY MEDICINE

## 2018-03-10 PROCEDURE — 2580000003 HC RX 258: Performed by: EMERGENCY MEDICINE

## 2018-03-10 PROCEDURE — S0028 INJECTION, FAMOTIDINE, 20 MG: HCPCS | Performed by: HOSPITALIST

## 2018-03-10 PROCEDURE — 82947 ASSAY GLUCOSE BLOOD QUANT: CPT

## 2018-03-10 PROCEDURE — 85045 AUTOMATED RETICULOCYTE COUNT: CPT

## 2018-03-10 PROCEDURE — 6360000002 HC RX W HCPCS: Performed by: HOSPITALIST

## 2018-03-10 PROCEDURE — 6370000000 HC RX 637 (ALT 250 FOR IP): Performed by: HOSPITALIST

## 2018-03-10 PROCEDURE — 2580000003 HC RX 258: Performed by: HOSPITALIST

## 2018-03-10 PROCEDURE — 82728 ASSAY OF FERRITIN: CPT

## 2018-03-10 PROCEDURE — 94770 HC ETCO2 MONITOR DAILY: CPT

## 2018-03-10 PROCEDURE — 80053 COMPREHEN METABOLIC PANEL: CPT

## 2018-03-10 PROCEDURE — 2060000000 HC ICU INTERMEDIATE R&B

## 2018-03-10 PROCEDURE — 6360000002 HC RX W HCPCS: Performed by: EMERGENCY MEDICINE

## 2018-03-10 PROCEDURE — 94640 AIRWAY INHALATION TREATMENT: CPT

## 2018-03-10 PROCEDURE — 6370000000 HC RX 637 (ALT 250 FOR IP): Performed by: INTERNAL MEDICINE

## 2018-03-10 PROCEDURE — 85055 RETICULATED PLATELET ASSAY: CPT

## 2018-03-10 PROCEDURE — 36415 COLL VENOUS BLD VENIPUNCTURE: CPT

## 2018-03-10 PROCEDURE — 80202 ASSAY OF VANCOMYCIN: CPT

## 2018-03-10 PROCEDURE — 94003 VENT MGMT INPAT SUBQ DAY: CPT

## 2018-03-10 PROCEDURE — 99291 CRITICAL CARE FIRST HOUR: CPT | Performed by: INTERNAL MEDICINE

## 2018-03-10 PROCEDURE — 83550 IRON BINDING TEST: CPT

## 2018-03-10 PROCEDURE — 94762 N-INVAS EAR/PLS OXIMTRY CONT: CPT

## 2018-03-10 PROCEDURE — 85025 COMPLETE CBC W/AUTO DIFF WBC: CPT

## 2018-03-10 RX ORDER — FUROSEMIDE 20 MG/1
20 TABLET ORAL DAILY
Status: DISCONTINUED | OUTPATIENT
Start: 2018-03-10 | End: 2018-03-10

## 2018-03-10 RX ORDER — 0.9 % SODIUM CHLORIDE 0.9 %
500 INTRAVENOUS SOLUTION INTRAVENOUS ONCE
Status: COMPLETED | OUTPATIENT
Start: 2018-03-10 | End: 2018-03-10

## 2018-03-10 RX ORDER — FUROSEMIDE 20 MG/1
20 TABLET ORAL DAILY
Status: DISCONTINUED | OUTPATIENT
Start: 2018-03-10 | End: 2018-03-15 | Stop reason: HOSPADM

## 2018-03-10 RX ORDER — ATORVASTATIN CALCIUM 40 MG/1
40 TABLET, FILM COATED ORAL NIGHTLY
Status: DISCONTINUED | OUTPATIENT
Start: 2018-03-10 | End: 2018-03-15 | Stop reason: HOSPADM

## 2018-03-10 RX ORDER — LEVOFLOXACIN 750 MG/1
750 TABLET ORAL DAILY
Status: DISCONTINUED | OUTPATIENT
Start: 2018-03-10 | End: 2018-03-12

## 2018-03-10 RX ORDER — OXYBUTYNIN CHLORIDE 5 MG/1
5 TABLET ORAL DAILY
Status: DISCONTINUED | OUTPATIENT
Start: 2018-03-10 | End: 2018-03-10

## 2018-03-10 RX ORDER — ARIPIPRAZOLE 10 MG/1
10 TABLET ORAL DAILY
Status: DISCONTINUED | OUTPATIENT
Start: 2018-03-10 | End: 2018-03-15 | Stop reason: HOSPADM

## 2018-03-10 RX ORDER — ASPIRIN 81 MG/1
81 TABLET, CHEWABLE ORAL DAILY
Status: DISCONTINUED | OUTPATIENT
Start: 2018-03-10 | End: 2018-03-15 | Stop reason: HOSPADM

## 2018-03-10 RX ORDER — FAMOTIDINE 20 MG/1
20 TABLET, FILM COATED ORAL 2 TIMES DAILY
Status: DISCONTINUED | OUTPATIENT
Start: 2018-03-10 | End: 2018-03-15 | Stop reason: HOSPADM

## 2018-03-10 RX ADMIN — IPRATROPIUM BROMIDE AND ALBUTEROL SULFATE 1 AMPULE: .5; 3 SOLUTION RESPIRATORY (INHALATION) at 03:24

## 2018-03-10 RX ADMIN — SERTRALINE 150 MG: 50 TABLET, FILM COATED ORAL at 20:56

## 2018-03-10 RX ADMIN — ARIPIPRAZOLE 10 MG: 10 TABLET ORAL at 09:24

## 2018-03-10 RX ADMIN — MORPHINE SULFATE 4 MG: 4 INJECTION, SOLUTION INTRAMUSCULAR; INTRAVENOUS at 19:58

## 2018-03-10 RX ADMIN — LORAZEPAM 1 MG: 2 INJECTION INTRAMUSCULAR; INTRAVENOUS at 20:57

## 2018-03-10 RX ADMIN — FAMOTIDINE 20 MG: 20 TABLET, FILM COATED ORAL at 15:22

## 2018-03-10 RX ADMIN — MORPHINE SULFATE 2 MG: 2 INJECTION, SOLUTION INTRAMUSCULAR; INTRAVENOUS at 09:30

## 2018-03-10 RX ADMIN — MORPHINE SULFATE 2 MG: 2 INJECTION, SOLUTION INTRAMUSCULAR; INTRAVENOUS at 00:56

## 2018-03-10 RX ADMIN — Medication 3 MG: at 20:56

## 2018-03-10 RX ADMIN — MEROPENEM 1 G: 1 INJECTION, POWDER, FOR SOLUTION INTRAVENOUS at 00:02

## 2018-03-10 RX ADMIN — SALINE NASAL SPRAY 1 SPRAY: 1.5 SOLUTION NASAL at 20:56

## 2018-03-10 RX ADMIN — LORAZEPAM 1 MG: 2 INJECTION INTRAMUSCULAR; INTRAVENOUS at 06:14

## 2018-03-10 RX ADMIN — IPRATROPIUM BROMIDE AND ALBUTEROL SULFATE 1 AMPULE: .5; 3 SOLUTION RESPIRATORY (INHALATION) at 19:29

## 2018-03-10 RX ADMIN — FUROSEMIDE 20 MG: 20 TABLET ORAL at 15:22

## 2018-03-10 RX ADMIN — QUETIAPINE FUMARATE 25 MG: 25 TABLET ORAL at 20:56

## 2018-03-10 RX ADMIN — FAMOTIDINE 20 MG: 10 INJECTION INTRAVENOUS at 09:23

## 2018-03-10 RX ADMIN — MICONAZORB AF ANTIFUNGAL POWDER: 1.42 POWDER TOPICAL at 09:23

## 2018-03-10 RX ADMIN — MICONAZORB AF ANTIFUNGAL POWDER: 1.42 POWDER TOPICAL at 20:56

## 2018-03-10 RX ADMIN — DESMOPRESSIN ACETATE 40 MG: 0.2 TABLET ORAL at 20:56

## 2018-03-10 RX ADMIN — LORAZEPAM 1 MG: 2 INJECTION INTRAMUSCULAR; INTRAVENOUS at 15:21

## 2018-03-10 RX ADMIN — Medication 10 ML: at 19:59

## 2018-03-10 RX ADMIN — FAMOTIDINE 20 MG: 20 TABLET, FILM COATED ORAL at 20:56

## 2018-03-10 RX ADMIN — ASPIRIN 81 MG: 81 TABLET, CHEWABLE ORAL at 09:23

## 2018-03-10 RX ADMIN — IPRATROPIUM BROMIDE AND ALBUTEROL SULFATE 1 AMPULE: .5; 3 SOLUTION RESPIRATORY (INHALATION) at 13:38

## 2018-03-10 RX ADMIN — SODIUM CHLORIDE 500 ML: 9 INJECTION, SOLUTION INTRAVENOUS at 02:19

## 2018-03-10 RX ADMIN — MEROPENEM 1 G: 1 INJECTION, POWDER, FOR SOLUTION INTRAVENOUS at 09:24

## 2018-03-10 RX ADMIN — IPRATROPIUM BROMIDE AND ALBUTEROL SULFATE 1 AMPULE: .5; 3 SOLUTION RESPIRATORY (INHALATION) at 08:00

## 2018-03-10 RX ADMIN — LEVOFLOXACIN 750 MG: 750 TABLET, FILM COATED ORAL at 15:24

## 2018-03-10 ASSESSMENT — PAIN SCALES - GENERAL
PAINLEVEL_OUTOF10: 7
PAINLEVEL_OUTOF10: 4

## 2018-03-10 ASSESSMENT — PULMONARY FUNCTION TESTS
PIF_VALUE: 31
PIF_VALUE: 29
PIF_VALUE: 29
PIF_VALUE: 20
PIF_VALUE: 29
PIF_VALUE: 14
PIF_VALUE: 28
PIF_VALUE: 29
PIF_VALUE: 14
PIF_VALUE: 26
PIF_VALUE: 28
PIF_VALUE: 48
PIF_VALUE: 30
PIF_VALUE: 25

## 2018-03-10 NOTE — PROGRESS NOTES
WBC 6.0 03/10/2018    RBC 3.83 03/10/2018    HGB 9.2 03/10/2018    HCT 33.3 03/10/2018    PLT See Reflexed IPF Result 03/10/2018    MCV 86.9 03/10/2018    MCH 24.0 03/10/2018    MCHC 27.6 03/10/2018    RDW 17.1 03/10/2018    LYMPHOPCT 9 03/10/2018    MONOPCT 12 03/10/2018    BASOPCT 0 03/10/2018    MONOSABS 0.72 03/10/2018    LYMPHSABS 0.54 03/10/2018    EOSABS 0.12 03/10/2018    BASOSABS 0.00 03/10/2018    DIFFTYPE NOT REPORTED 03/10/2018     BMP:    Lab Results   Component Value Date     03/10/2018    K 3.6 03/10/2018     03/10/2018    CO2 32 03/10/2018    BUN 8 03/10/2018    LABALBU 2.3 03/10/2018    CREATININE 0.33 03/10/2018    CALCIUM 7.2 03/10/2018    GFRAA >60 03/10/2018    LABGLOM >60 03/10/2018    GLUCOSE 94 03/10/2018     Hepatic Function Panel:    Lab Results   Component Value Date    ALKPHOS 41 03/10/2018    ALT 8 03/10/2018    AST 14 03/10/2018    PROT 4.9 03/10/2018    BILITOT 0.28 03/10/2018    BILIDIR <0.08 09/13/2017    IBILI CANNOT BE CALCULATED 09/13/2017    LABALBU 2.3 03/10/2018     PT/INR:    Lab Results   Component Value Date    PROTIME 11.4 10/08/2016    INR 1.1 10/08/2016     Last 3 Troponin:    Lab Results   Component Value Date    TROPONINI 0.07 12/09/2013    TROPONINI 0.12 12/09/2013    TROPONINI 0.15 12/09/2013         Radiology/Imaging:  Impression:        Suboptimal positioning.  Bilateral interstitial and alveolar opacities are  noted along with small effusions.  These findings are most consistent with  edema however an underlying inflammatory process or infection should also be  considered in the appropriate clinical setting.  These findings have  progressed since the most recent exam.         ASSESSMENT:     Patient Active Problem List    Diagnosis Date Noted    Bibasilar pneumonia - aspiration vs HCAP 03/08/2018    Acute respiratory failure with hypercapnia ([address] 03/08/2018    Tracheostomy present ([facility] 03/08/2018    Chronic Atrial Fibrillation 03/08/2018   

## 2018-03-10 NOTE — PROGRESS NOTES
Transfer from ICU, Oriented to Room telemetry monitor on, will continue to monitor.   Electronically signed by Vandana Robertson RN on 3/10/2018 at 5:56 PM

## 2018-03-11 LAB
ABSOLUTE EOS #: 0.09 K/UL (ref 0–0.44)
ABSOLUTE IMMATURE GRANULOCYTE: 0.05 K/UL (ref 0–0.3)
ABSOLUTE LYMPH #: 0.55 K/UL (ref 1.1–3.7)
ABSOLUTE MONO #: 0.46 K/UL (ref 0.1–1.2)
ANION GAP SERPL CALCULATED.3IONS-SCNC: 4 MMOL/L (ref 9–17)
BASOPHILS # BLD: 0 % (ref 0–2)
BASOPHILS ABSOLUTE: 0 K/UL (ref 0–0.2)
BUN BLDV-MCNC: 8 MG/DL (ref 8–23)
BUN/CREAT BLD: ABNORMAL (ref 9–20)
CALCIUM SERPL-MCNC: 8.8 MG/DL (ref 8.6–10.4)
CHLORIDE BLD-SCNC: 97 MMOL/L (ref 98–107)
CO2: 37 MMOL/L (ref 20–31)
CREAT SERPL-MCNC: 0.37 MG/DL (ref 0.5–0.9)
DIFFERENTIAL TYPE: ABNORMAL
EOSINOPHILS RELATIVE PERCENT: 2 % (ref 1–4)
GFR AFRICAN AMERICAN: >60 ML/MIN
GFR NON-AFRICAN AMERICAN: >60 ML/MIN
GFR SERPL CREATININE-BSD FRML MDRD: ABNORMAL ML/MIN/{1.73_M2}
GFR SERPL CREATININE-BSD FRML MDRD: ABNORMAL ML/MIN/{1.73_M2}
GLUCOSE BLD-MCNC: 111 MG/DL (ref 65–105)
GLUCOSE BLD-MCNC: 112 MG/DL (ref 65–105)
GLUCOSE BLD-MCNC: 115 MG/DL (ref 70–99)
GLUCOSE BLD-MCNC: 137 MG/DL (ref 65–105)
GLUCOSE BLD-MCNC: 160 MG/DL (ref 65–105)
HCT VFR BLD CALC: 31 % (ref 36.3–47.1)
HEMOGLOBIN: 8.6 G/DL (ref 11.9–15.1)
IMMATURE GRANULOCYTES: 1 %
LYMPHOCYTES # BLD: 12 % (ref 24–43)
MCH RBC QN AUTO: 23.4 PG (ref 25.2–33.5)
MCHC RBC AUTO-ENTMCNC: 27.7 G/DL (ref 28.4–34.8)
MCV RBC AUTO: 84.2 FL (ref 82.6–102.9)
MONOCYTES # BLD: 10 % (ref 3–12)
MORPHOLOGY: ABNORMAL
NRBC AUTOMATED: 0 PER 100 WBC
PDW BLD-RTO: 17 % (ref 11.8–14.4)
PLATELET # BLD: ABNORMAL K/UL (ref 138–453)
PLATELET ESTIMATE: ABNORMAL
PLATELET, FLUORESCENCE: 105 K/UL (ref 138–453)
PLATELET, IMMATURE FRACTION: 5.5 % (ref 1.1–10.3)
PMV BLD AUTO: ABNORMAL FL (ref 8.1–13.5)
POTASSIUM SERPL-SCNC: 4.4 MMOL/L (ref 3.7–5.3)
RBC # BLD: 3.68 M/UL (ref 3.95–5.11)
RBC # BLD: ABNORMAL 10*6/UL
SEG NEUTROPHILS: 75 % (ref 36–65)
SEGMENTED NEUTROPHILS ABSOLUTE COUNT: 3.45 K/UL (ref 1.5–8.1)
SODIUM BLD-SCNC: 138 MMOL/L (ref 135–144)
WBC # BLD: 4.6 K/UL (ref 3.5–11.3)
WBC # BLD: ABNORMAL 10*3/UL

## 2018-03-11 PROCEDURE — 6370000000 HC RX 637 (ALT 250 FOR IP): Performed by: EMERGENCY MEDICINE

## 2018-03-11 PROCEDURE — 6370000000 HC RX 637 (ALT 250 FOR IP): Performed by: HOSPITALIST

## 2018-03-11 PROCEDURE — 6360000002 HC RX W HCPCS: Performed by: EMERGENCY MEDICINE

## 2018-03-11 PROCEDURE — 85025 COMPLETE CBC W/AUTO DIFF WBC: CPT

## 2018-03-11 PROCEDURE — 6370000000 HC RX 637 (ALT 250 FOR IP): Performed by: INTERNAL MEDICINE

## 2018-03-11 PROCEDURE — 80048 BASIC METABOLIC PNL TOTAL CA: CPT

## 2018-03-11 PROCEDURE — 82947 ASSAY GLUCOSE BLOOD QUANT: CPT

## 2018-03-11 PROCEDURE — 94640 AIRWAY INHALATION TREATMENT: CPT

## 2018-03-11 PROCEDURE — 31502 CHANGE OF WINDPIPE AIRWAY: CPT

## 2018-03-11 PROCEDURE — 6360000002 HC RX W HCPCS: Performed by: INTERNAL MEDICINE

## 2018-03-11 PROCEDURE — 99232 SBSQ HOSP IP/OBS MODERATE 35: CPT | Performed by: INTERNAL MEDICINE

## 2018-03-11 PROCEDURE — 94003 VENT MGMT INPAT SUBQ DAY: CPT

## 2018-03-11 PROCEDURE — 85055 RETICULATED PLATELET ASSAY: CPT

## 2018-03-11 PROCEDURE — 36415 COLL VENOUS BLD VENIPUNCTURE: CPT

## 2018-03-11 PROCEDURE — 99223 1ST HOSP IP/OBS HIGH 75: CPT | Performed by: INTERNAL MEDICINE

## 2018-03-11 PROCEDURE — 94770 HC ETCO2 MONITOR DAILY: CPT

## 2018-03-11 PROCEDURE — 94762 N-INVAS EAR/PLS OXIMTRY CONT: CPT

## 2018-03-11 PROCEDURE — 2580000003 HC RX 258: Performed by: HOSPITALIST

## 2018-03-11 PROCEDURE — 2060000000 HC ICU INTERMEDIATE R&B

## 2018-03-11 RX ORDER — LORAZEPAM 2 MG/ML
1 INJECTION INTRAMUSCULAR EVERY 4 HOURS PRN
Status: DISCONTINUED | OUTPATIENT
Start: 2018-03-11 | End: 2018-03-15 | Stop reason: HOSPADM

## 2018-03-11 RX ADMIN — DESMOPRESSIN ACETATE 40 MG: 0.2 TABLET ORAL at 21:27

## 2018-03-11 RX ADMIN — IPRATROPIUM BROMIDE AND ALBUTEROL SULFATE 1 AMPULE: .5; 3 SOLUTION RESPIRATORY (INHALATION) at 07:24

## 2018-03-11 RX ADMIN — LORAZEPAM 1 MG: 2 INJECTION INTRAMUSCULAR; INTRAVENOUS at 04:58

## 2018-03-11 RX ADMIN — MORPHINE SULFATE 2 MG: 2 INJECTION, SOLUTION INTRAMUSCULAR; INTRAVENOUS at 08:04

## 2018-03-11 RX ADMIN — IPRATROPIUM BROMIDE AND ALBUTEROL SULFATE 1 AMPULE: .5; 3 SOLUTION RESPIRATORY (INHALATION) at 01:29

## 2018-03-11 RX ADMIN — LEVOFLOXACIN 750 MG: 750 TABLET, FILM COATED ORAL at 07:58

## 2018-03-11 RX ADMIN — MORPHINE SULFATE 2 MG: 2 INJECTION, SOLUTION INTRAMUSCULAR; INTRAVENOUS at 19:18

## 2018-03-11 RX ADMIN — Medication 10 ML: at 08:04

## 2018-03-11 RX ADMIN — FAMOTIDINE 20 MG: 20 TABLET, FILM COATED ORAL at 07:59

## 2018-03-11 RX ADMIN — LORAZEPAM 1 MG: 2 INJECTION INTRAMUSCULAR; INTRAVENOUS at 11:31

## 2018-03-11 RX ADMIN — FAMOTIDINE 20 MG: 20 TABLET, FILM COATED ORAL at 21:27

## 2018-03-11 RX ADMIN — MICONAZORB AF ANTIFUNGAL POWDER: 1.42 POWDER TOPICAL at 08:09

## 2018-03-11 RX ADMIN — ARIPIPRAZOLE 10 MG: 10 TABLET ORAL at 07:59

## 2018-03-11 RX ADMIN — IPRATROPIUM BROMIDE AND ALBUTEROL SULFATE 1 AMPULE: .5; 3 SOLUTION RESPIRATORY (INHALATION) at 14:30

## 2018-03-11 RX ADMIN — Medication 3 MG: at 21:27

## 2018-03-11 RX ADMIN — QUETIAPINE FUMARATE 25 MG: 25 TABLET ORAL at 21:27

## 2018-03-11 RX ADMIN — FUROSEMIDE 20 MG: 20 TABLET ORAL at 07:59

## 2018-03-11 RX ADMIN — SERTRALINE 150 MG: 50 TABLET, FILM COATED ORAL at 21:27

## 2018-03-11 RX ADMIN — Medication 10 ML: at 21:27

## 2018-03-11 RX ADMIN — MICONAZORB AF ANTIFUNGAL POWDER: 1.42 POWDER TOPICAL at 21:28

## 2018-03-11 RX ADMIN — IPRATROPIUM BROMIDE AND ALBUTEROL SULFATE 1 AMPULE: .5; 3 SOLUTION RESPIRATORY (INHALATION) at 19:13

## 2018-03-11 RX ADMIN — LORAZEPAM 1 MG: 2 INJECTION INTRAMUSCULAR; INTRAVENOUS at 15:49

## 2018-03-11 RX ADMIN — ASPIRIN 81 MG: 81 TABLET, CHEWABLE ORAL at 07:59

## 2018-03-11 RX ADMIN — SALINE NASAL SPRAY 1 SPRAY: 1.5 SOLUTION NASAL at 21:27

## 2018-03-11 ASSESSMENT — PAIN SCALES - GENERAL
PAINLEVEL_OUTOF10: 5
PAINLEVEL_OUTOF10: 6
PAINLEVEL_OUTOF10: 6
PAINLEVEL_OUTOF10: 3
PAINLEVEL_OUTOF10: 3

## 2018-03-11 ASSESSMENT — PAIN DESCRIPTION - PAIN TYPE: TYPE: CHRONIC PAIN

## 2018-03-11 ASSESSMENT — PAIN DESCRIPTION - LOCATION: LOCATION: THROAT

## 2018-03-11 ASSESSMENT — PULMONARY FUNCTION TESTS
PIF_VALUE: 30
PIF_VALUE: 31
PIF_VALUE: 32
PIF_VALUE: 31
PIF_VALUE: 41
PIF_VALUE: 32

## 2018-03-11 NOTE — PROGRESS NOTES
INTENSIVE CARE UNIT  Resident Physician Progress Note    Patient - Richar Belcher  Date of Admission -  3/8/2018  7:33 AM  Date of Evaluation -  3/11/2018  Room and Bed Number -  [facility] Day - 3    Cc- acute on chr respiratory failure   SUBJECTIVE:     OVERNIGHT EVENTS:      Anxious   On prvc did not wean well       AWAKE & FOLLOWING COMMANDS:  [] No   [x] Yes    SECRETIONS Amount:  [x] Small [] Moderate  [] Large  [] None  Color:     [x] White [] Colored  [] Bloody    SEDATION:  RAAS Score:  [] Propofol gtt  [] Versed gtt  [] Ativan gtt   [x] No Sedation    PARALYZED:  [x] No    [] Yes    VASOPRESSORS:  [x] No    [] Yes  [] Levophed [] Dopamine [] Vasopressin  [] Dobutamine [] Phenylephrine [] Epinephrine  Ros unable to perform due to trach     OBJECTIVE:     VITAL SIGNS:  BP (!) 116/55   Pulse 107   Temp 98.6 °F (37 °C) (Axillary)   Resp 24   Ht 5' 4\" (1.626 m)   Wt (!) 310 lb 6.5 oz (140.8 kg)   SpO2 91%   BMI 53.28 kg/m²   Tmax over 24 hours:  Temp (24hrs), Av.3 °F (37.4 °C), Min:98.6 °F (37 °C), Max:100.6 °F (38.1 °C)      Patient Vitals for the past 8 hrs:   BP Temp Temp src Pulse Resp SpO2   18 1238 (!) 116/55 98.6 °F (37 °C) Axillary 107 24 91 %   18 1111 - - - 107 (!) 32 90 %   18 0800 - - - 117 - -   18 0745 124/60 99.9 °F (37.7 °C) Axillary 112 23 97 %   18 0724 - - - 84 20 95 %         Intake/Output Summary (Last 24 hours) at 18 1324  Last data filed at 18 1200   Gross per 24 hour   Intake              960 ml   Output             1045 ml   Net              -85 ml       Date 18 0000 - 18 2354   Shift 5760-2113 9711-3672 5327-8890 24 Hour Total   I  N  T  A  K  E   P.O.  (mL/kg/hr) 350  (0.3)   350    Shift Total  (mL/kg) 350  (2.5)   350  (2.5)   O  U  T  P  U  T   Urine  (mL/kg/hr) 750  (0.7) 120  870    Shift Total  (mL/kg) 750  (5.3) 120  (0.9)  870  (6.2)   Weight (kg) 140.8 140.8 140.8 140. 8     Wt Readings from Last 3

## 2018-03-11 NOTE — PLAN OF CARE
Problem: OXYGENATION/RESPIRATORY FUNCTION  Goal: Patient will maintain patent airway  Outcome: Ongoing    Goal: Patient will achieve/maintain normal respiratory rate/effort  Respiratory rate and effort will be within normal limits for the patient   Outcome: Ongoing      Problem: MECHANICAL VENTILATION  Goal: Patient will maintain patent airway  Outcome: Ongoing    Goal: Oral health is maintained or improved  Outcome: Ongoing    Goal: Tracheostomy will be managed safely  Outcome: Ongoing    Goal: Ability to express needs and understand communication  Outcome: Ongoing    Goal: Mobility/activity is maintained at optimum level for patient  Outcome: Ongoing      Problem: SKIN INTEGRITY  Goal: Skin integrity is maintained or improved  Outcome: Ongoing

## 2018-03-11 NOTE — H&P
low.      Physical Exam:   Vitals: BP (!) 122/57   Pulse 87   Temp 99 °F (37.2 °C) (Axillary)   Resp 21   Ht 5' 4\" (1.626 m)   Wt (!) 310 lb 6.5 oz (140.8 kg)   SpO2 91%   BMI 53.28 kg/m²   24 hour intake/output:  Intake/Output Summary (Last 24 hours) at 03/11/18 0576  Last data filed at 03/11/18 0450   Gross per 24 hour   Intake             1236 ml   Output             1200 ml   Net               36 ml     Last 3 weights: Wt Readings from Last 3 Encounters:   03/10/18 (!) 310 lb 6.5 oz (140.8 kg)   01/08/18 290 lb (131.5 kg)   12/22/13 (!) 327 lb 6.1 oz (148.5 kg)     · General appearance: awake, alert, cooperative  · HEENT: Head: Normocephalic, no lesions, without obvious abnormality. · Lungs: Decreased breath sounds bilaterally. Tracheostomy present. · Heart: regular rate and rhythm, S1, S2 normal, no murmur  · Abdomen: soft, non-tender; bowel sounds normal; no masses,  no organomegaly  · Extremities: extremities normal, atraumatic, no cyanosis or edema  · Neurological:  Awake, alert, oriented to name, place and time. Cranial nerves II-XII are grossly intact. Reflexes normal and symmetric.  Sensation grossly normal    Medications:Current Inpatient  Scheduled Meds:   ARIPiprazole  10 mg Oral Daily    aspirin  81 mg Oral Daily    atorvastatin  40 mg Per NG tube Nightly    sertraline  150 mg Oral Nightly    levofloxacin  750 mg Oral Daily    famotidine  20 mg PEG Tube BID    furosemide  20 mg Oral Daily    influenza virus vaccine  0.5 mL Intramuscular Once    sodium chloride flush  10 mL Intravenous 2 times per day    miconazole   Topical BID    ipratropium-albuterol  1 ampule Inhalation Q6H WA    enoxaparin  30 mg Subcutaneous BID     Continuous Infusions:   sodium chloride 75 mL/hr at 03/09/18 2155     PRN Meds:sodium chloride, melatonin, QUEtiapine, sodium chloride flush, magnesium hydroxide, ondansetron, albuterol, metoprolol, LORazepam, morphine **OR** morphine    Objective:    CBC: respiratory failure with hypercapnia (HCC)  Active Problems:    Cellulitis of leg, left    Metabolic Alkalosis secondary chronic respiratory failure    Bibasilar pneumonia - aspiration vs HCAP    Tracheostomy present (HCC)    Chronic Atrial Fibrillation    Urinary tract infection with hematuria    Bipolar disorder (HCC)    Hyperglycemia  Resolved Problems:    * No resolved hospital problems. *          Plan:      Cont IV fluids  Cont oral levaquin  Cultures Negative so far  Cont lasix. LE dopplers negative. Cont breathing treatments  To be weaned off vent and switched to O2 when able. Aspiration precautions  Started on dysphagia diet. Home medications reconciled  Will go back to [facility].         Kathy Dewey MD  Internal Medicine [address], [facility]  PGY-1      IM Attending    Pt seen and examined today   I have discussed the care of pt , including pertinent history and exam findings,  with the resident. I have reviewed the key elements of all parts of the encounter with the resident. I agree with the assessment, plan and orders as documented by the resident.     Electronically signed by Modesta Moses MD on 3/11/2018 at 11:53 PM

## 2018-03-12 ENCOUNTER — APPOINTMENT (OUTPATIENT)
Dept: CT IMAGING | Age: 63
DRG: 207 | End: 2018-03-12
Attending: INTERNAL MEDICINE
Payer: COMMERCIAL

## 2018-03-12 PROBLEM — G93.41 ACUTE METABOLIC ENCEPHALOPATHY: Status: ACTIVE | Noted: 2018-03-12

## 2018-03-12 PROBLEM — R73.9 HYPERGLYCEMIA: Status: RESOLVED | Noted: 2018-03-08 | Resolved: 2018-03-12

## 2018-03-12 LAB
ABSOLUTE EOS #: 0.04 K/UL (ref 0–0.44)
ABSOLUTE IMMATURE GRANULOCYTE: 0.04 K/UL (ref 0–0.3)
ABSOLUTE LYMPH #: 0.92 K/UL (ref 1.1–3.7)
ABSOLUTE MONO #: 0.32 K/UL (ref 0.1–1.2)
ANION GAP SERPL CALCULATED.3IONS-SCNC: 7 MMOL/L (ref 9–17)
BASOPHILS # BLD: 0 % (ref 0–2)
BASOPHILS ABSOLUTE: <0.03 K/UL (ref 0–0.2)
BNP INTERPRETATION: NORMAL
BUN BLDV-MCNC: 10 MG/DL (ref 8–23)
BUN/CREAT BLD: ABNORMAL (ref 9–20)
CALCIUM SERPL-MCNC: 8.8 MG/DL (ref 8.6–10.4)
CHLORIDE BLD-SCNC: 95 MMOL/L (ref 98–107)
CO2: 38 MMOL/L (ref 20–31)
CREAT SERPL-MCNC: 0.42 MG/DL (ref 0.5–0.9)
DIFFERENTIAL TYPE: ABNORMAL
EOSINOPHILS RELATIVE PERCENT: 1 % (ref 1–4)
GFR AFRICAN AMERICAN: >60 ML/MIN
GFR NON-AFRICAN AMERICAN: >60 ML/MIN
GFR SERPL CREATININE-BSD FRML MDRD: ABNORMAL ML/MIN/{1.73_M2}
GFR SERPL CREATININE-BSD FRML MDRD: ABNORMAL ML/MIN/{1.73_M2}
GLUCOSE BLD-MCNC: 122 MG/DL (ref 65–105)
GLUCOSE BLD-MCNC: 96 MG/DL (ref 70–99)
HCT VFR BLD CALC: 31.5 % (ref 36.3–47.1)
HEMOGLOBIN: 8.6 G/DL (ref 11.9–15.1)
IMMATURE GRANULOCYTES: 1 %
LYMPHOCYTES # BLD: 20 % (ref 24–43)
MCH RBC QN AUTO: 23.2 PG (ref 25.2–33.5)
MCHC RBC AUTO-ENTMCNC: 27.3 G/DL (ref 28.4–34.8)
MCV RBC AUTO: 84.9 FL (ref 82.6–102.9)
MONOCYTES # BLD: 7 % (ref 3–12)
NRBC AUTOMATED: 0 PER 100 WBC
PATHOLOGIST REVIEW: NORMAL
PDW BLD-RTO: 17 % (ref 11.8–14.4)
PLATELET # BLD: ABNORMAL K/UL (ref 138–453)
PLATELET ESTIMATE: ABNORMAL
PLATELET, FLUORESCENCE: 102 K/UL (ref 138–453)
PLATELET, IMMATURE FRACTION: 7 % (ref 1.1–10.3)
PMV BLD AUTO: ABNORMAL FL (ref 8.1–13.5)
POTASSIUM SERPL-SCNC: 4 MMOL/L (ref 3.7–5.3)
PRO-BNP: 275 PG/ML
RBC # BLD: 3.71 M/UL (ref 3.95–5.11)
RBC # BLD: ABNORMAL 10*6/UL
SEG NEUTROPHILS: 72 % (ref 36–65)
SEGMENTED NEUTROPHILS ABSOLUTE COUNT: 3.41 K/UL (ref 1.5–8.1)
SODIUM BLD-SCNC: 140 MMOL/L (ref 135–144)
SURGICAL PATHOLOGY REPORT: NORMAL
WBC # BLD: 4.7 K/UL (ref 3.5–11.3)
WBC # BLD: ABNORMAL 10*3/UL

## 2018-03-12 PROCEDURE — 2060000000 HC ICU INTERMEDIATE R&B

## 2018-03-12 PROCEDURE — 87070 CULTURE OTHR SPECIMN AEROBIC: CPT

## 2018-03-12 PROCEDURE — 85055 RETICULATED PLATELET ASSAY: CPT

## 2018-03-12 PROCEDURE — 6360000002 HC RX W HCPCS: Performed by: INTERNAL MEDICINE

## 2018-03-12 PROCEDURE — 86403 PARTICLE AGGLUT ANTBDY SCRN: CPT

## 2018-03-12 PROCEDURE — 87255 GENET VIRUS ISOLATE HSV: CPT

## 2018-03-12 PROCEDURE — 99233 SBSQ HOSP IP/OBS HIGH 50: CPT | Performed by: INTERNAL MEDICINE

## 2018-03-12 PROCEDURE — 97162 PT EVAL MOD COMPLEX 30 MIN: CPT

## 2018-03-12 PROCEDURE — 99221 1ST HOSP IP/OBS SF/LOW 40: CPT | Performed by: FAMILY MEDICINE

## 2018-03-12 PROCEDURE — 2580000003 HC RX 258: Performed by: INTERNAL MEDICINE

## 2018-03-12 PROCEDURE — 97530 THERAPEUTIC ACTIVITIES: CPT

## 2018-03-12 PROCEDURE — 94003 VENT MGMT INPAT SUBQ DAY: CPT

## 2018-03-12 PROCEDURE — G8979 MOBILITY GOAL STATUS: HCPCS

## 2018-03-12 PROCEDURE — 87186 SC STD MICRODIL/AGAR DIL: CPT

## 2018-03-12 PROCEDURE — 6360000002 HC RX W HCPCS: Performed by: EMERGENCY MEDICINE

## 2018-03-12 PROCEDURE — 87300 AG DETECTION POLYVAL IF: CPT

## 2018-03-12 PROCEDURE — 83880 ASSAY OF NATRIURETIC PEPTIDE: CPT

## 2018-03-12 PROCEDURE — 87205 SMEAR GRAM STAIN: CPT

## 2018-03-12 PROCEDURE — 6370000000 HC RX 637 (ALT 250 FOR IP): Performed by: HOSPITALIST

## 2018-03-12 PROCEDURE — 94770 HC ETCO2 MONITOR DAILY: CPT

## 2018-03-12 PROCEDURE — 94762 N-INVAS EAR/PLS OXIMTRY CONT: CPT

## 2018-03-12 PROCEDURE — G8978 MOBILITY CURRENT STATUS: HCPCS

## 2018-03-12 PROCEDURE — 97535 SELF CARE MNGMENT TRAINING: CPT

## 2018-03-12 PROCEDURE — G8987 SELF CARE CURRENT STATUS: HCPCS

## 2018-03-12 PROCEDURE — 87140 CULTURE TYPE IMMUNOFLUORESC: CPT

## 2018-03-12 PROCEDURE — 97166 OT EVAL MOD COMPLEX 45 MIN: CPT

## 2018-03-12 PROCEDURE — 6370000000 HC RX 637 (ALT 250 FOR IP): Performed by: EMERGENCY MEDICINE

## 2018-03-12 PROCEDURE — 2580000003 HC RX 258: Performed by: HOSPITALIST

## 2018-03-12 PROCEDURE — G8988 SELF CARE GOAL STATUS: HCPCS

## 2018-03-12 PROCEDURE — 80048 BASIC METABOLIC PNL TOTAL CA: CPT

## 2018-03-12 PROCEDURE — 85025 COMPLETE CBC W/AUTO DIFF WBC: CPT

## 2018-03-12 PROCEDURE — 94640 AIRWAY INHALATION TREATMENT: CPT

## 2018-03-12 PROCEDURE — 71250 CT THORAX DX C-: CPT

## 2018-03-12 PROCEDURE — 36415 COLL VENOUS BLD VENIPUNCTURE: CPT

## 2018-03-12 PROCEDURE — 6370000000 HC RX 637 (ALT 250 FOR IP): Performed by: INTERNAL MEDICINE

## 2018-03-12 PROCEDURE — 87252 VIRUS INOCULATION TISSUE: CPT

## 2018-03-12 PROCEDURE — 87015 SPECIMEN INFECT AGNT CONCNTJ: CPT

## 2018-03-12 PROCEDURE — 82947 ASSAY GLUCOSE BLOOD QUANT: CPT

## 2018-03-12 RX ORDER — LEVOFLOXACIN 5 MG/ML
750 INJECTION, SOLUTION INTRAVENOUS EVERY 24 HOURS
Status: DISCONTINUED | OUTPATIENT
Start: 2018-03-13 | End: 2018-03-15

## 2018-03-12 RX ADMIN — LORAZEPAM 1 MG: 2 INJECTION INTRAMUSCULAR; INTRAVENOUS at 11:09

## 2018-03-12 RX ADMIN — IPRATROPIUM BROMIDE AND ALBUTEROL SULFATE 1 AMPULE: .5; 3 SOLUTION RESPIRATORY (INHALATION) at 08:44

## 2018-03-12 RX ADMIN — IPRATROPIUM BROMIDE AND ALBUTEROL SULFATE 1 AMPULE: .5; 3 SOLUTION RESPIRATORY (INHALATION) at 19:58

## 2018-03-12 RX ADMIN — LORAZEPAM 1 MG: 2 INJECTION INTRAMUSCULAR; INTRAVENOUS at 17:53

## 2018-03-12 RX ADMIN — SERTRALINE 150 MG: 50 TABLET, FILM COATED ORAL at 20:49

## 2018-03-12 RX ADMIN — LEVOFLOXACIN 750 MG: 750 TABLET, FILM COATED ORAL at 11:08

## 2018-03-12 RX ADMIN — Medication 10 ML: at 20:49

## 2018-03-12 RX ADMIN — Medication 3 MG: at 20:48

## 2018-03-12 RX ADMIN — ARIPIPRAZOLE 10 MG: 10 TABLET ORAL at 11:08

## 2018-03-12 RX ADMIN — LORAZEPAM 1 MG: 2 INJECTION INTRAMUSCULAR; INTRAVENOUS at 04:56

## 2018-03-12 RX ADMIN — IPRATROPIUM BROMIDE AND ALBUTEROL SULFATE 1 AMPULE: .5; 3 SOLUTION RESPIRATORY (INHALATION) at 13:43

## 2018-03-12 RX ADMIN — DESMOPRESSIN ACETATE 40 MG: 0.2 TABLET ORAL at 20:49

## 2018-03-12 RX ADMIN — Medication 10 ML: at 11:20

## 2018-03-12 RX ADMIN — MICONAZORB AF ANTIFUNGAL POWDER: 1.42 POWDER TOPICAL at 21:00

## 2018-03-12 RX ADMIN — FAMOTIDINE 20 MG: 20 TABLET, FILM COATED ORAL at 21:01

## 2018-03-12 RX ADMIN — MICONAZORB AF ANTIFUNGAL POWDER: 1.42 POWDER TOPICAL at 11:11

## 2018-03-12 RX ADMIN — QUETIAPINE FUMARATE 25 MG: 25 TABLET ORAL at 20:49

## 2018-03-12 RX ADMIN — FAMOTIDINE 20 MG: 20 TABLET, FILM COATED ORAL at 11:08

## 2018-03-12 RX ADMIN — FUROSEMIDE 20 MG: 20 TABLET ORAL at 11:08

## 2018-03-12 RX ADMIN — IRON SUCROSE 200 MG: 20 INJECTION, SOLUTION INTRAVENOUS at 12:22

## 2018-03-12 RX ADMIN — ASPIRIN 81 MG: 81 TABLET, CHEWABLE ORAL at 11:08

## 2018-03-12 ASSESSMENT — PULMONARY FUNCTION TESTS
PIF_VALUE: 29
PIF_VALUE: 31
PIF_VALUE: 32
PIF_VALUE: 32
PIF_VALUE: 33
PIF_VALUE: 28

## 2018-03-12 ASSESSMENT — PAIN SCALES - GENERAL
PAINLEVEL_OUTOF10: 0

## 2018-03-12 ASSESSMENT — ENCOUNTER SYMPTOMS
SPUTUM PRODUCTION: 0
WHEEZING: 0
BACK PAIN: 1
BLURRED VISION: 0
ORTHOPNEA: 1
HEMOPTYSIS: 0
COUGH: 0
SHORTNESS OF BREATH: 1

## 2018-03-12 NOTE — CONSULTS
respiratory failure with hypercapnia (HCC) [J96.02] 03/08/2018    Tracheostomy present ([facility] [Z93.0] 03/08/2018    Bibasilar pneumonia - aspiration vs HCAP [J18.9] 03/08/2018    Chronic Atrial Fibrillation [I48.91] 03/08/2018    Urinary tract infection with hematuria [N39.0, R31.9] 03/08/2018    Bipolar disorder ([facility] [F31.9] 03/08/2018    Hyperglycemia [R73.9] 80/05/0253    Metabolic Alkalosis secondary chronic respiratory failure [E87.3] 12/13/2013    Cellulitis of leg, left [L03.116] 12/09/2013       PAST MEDICAL HISTORY      Diagnosis Date    Asthma     Bipolar 1 disorder ([facility]     CAD (coronary artery disease)     Cellulitis of leg, left     Cervicalgia     CHF (congestive heart failure) (HCC)     Dextrocardia     Hyperlipidemia     Hypertension     MRSA (methicillin resistant staph aureus) culture positive 03/08/2018    nares    Pickwickian syndrome ([facility]     Umbilical hernia        PAST SURGICAL HISTORY  Past Surgical History:   Procedure Laterality Date    GASTROSTOMY TUBE PLACEMENT      now removed    TRACHEOSTOMY         SOCIAL HISTORY  Social History   Substance Use Topics    Smoking status: Never Smoker    Smokeless tobacco: Never Used    Alcohol use Not on file       ALLERGIES  Allergies   Allergen Reactions    Mobic [Meloxicam]     Pcn [Penicillins]     Risperidone Palpitations         MEDICATIONS  Current Medications    iron sucrose  200 mg Intravenous Q24H    ARIPiprazole  10 mg Oral Daily    aspirin  81 mg Oral Daily    atorvastatin  40 mg Per NG tube Nightly    sertraline  150 mg Oral Nightly    levofloxacin  750 mg Oral Daily    famotidine  20 mg PEG Tube BID    furosemide  20 mg Oral Daily    influenza virus vaccine  0.5 mL Intramuscular Once    sodium chloride flush  10 mL Intravenous 2 times per day    miconazole   Topical BID    ipratropium-albuterol  1 ampule Inhalation Q6H WA    enoxaparin  30 mg Subcutaneous BID     LORazepam, sodium chloride, Medication(s) being used: narcotic analgesics including morphine. Anxiety:  fatigue, shortness of breath                          Dyspnea:  acute dyspnea and chronic dyspnea                          Fatigue:  Tiredness     We feel the patient symptoms are being  controlled. her current regimen is reviewed by myself and discussed with the staff. 2- Goals of care evaluation   The patient goals of care are live longer, improve or maintain function/quality of life, provide comfort care/support/palliation/relieve suffering, accomplish a particular personal goal, spiritual needs, preserve independence/autonomy/control and support for family/caregiver   Goals of care discussed with:    [x] Patient independently    [] Patient and Family    [] Family or Healthcare DPOA independently    [] Unable to discuss with patient, family/DPOA not present    3- Code Status  Full Code    4- Other recommendations   - We will follow-up on the POA paperwork   - We will continue to provide support and strength to the patient and the family     Please call with any palliative questions or concerns. Palliative Care Team is available via perfect serve or via phone. Palliative Care will continue to follow Ms. Anderson's care as needed. The total time spent in seeing the patient and discussing goals of care was 55 minutes. The note has been dictated by dragon, typing errors may be a possibility. Thank you for allowing Palliative Care to participate in the care of Ms. Cyril Jacobo .     Electronically signed by   Buffy Lee MD  Palliative Care Team  on 3/12/2018 at 1:19 PM    Palliative care office: [phone]

## 2018-03-12 NOTE — PROGRESS NOTES
Physical Therapy    Facility/Department: [facility] 4B STEPDOWN  Initial Assessment    NAME: Denton Rosado  : 1955  MRN: [id number]    Date of Service: 3/12/2018  Patient Active Problem List   Diagnosis    Lower urinary tract infectious disease    Respiratory failure ([address]    NSTEMI (non-ST elevated myocardial infarction) ([address]    Cellulitis of leg, left    CHF (congestive heart failure) ([address]    Metabolic Alkalosis secondary chronic respiratory failure    Aspiration pneumonia ([address]    Hypoxemia    Tracheostomy obstruction ([address]    Acute respiratory failure ([address]    Tracheostomy present ([address]    Atrial fibrillation ([address]       Patient Diagnosis(es): There were no encounter diagnoses. has a past medical history of Asthma; Bipolar 1 disorder ([address] CAD (coronary artery disease); Cellulitis of leg, left; Cervicalgia; CHF (congestive heart failure) ([address] Dextrocardia; Hyperlipidemia; Hypertension; MRSA (methicillin resistant staph aureus) culture positive; Pickwickian syndrome ([address] and Umbilical hernia. has a past surgical history that includes tracheostomy and Gastrostomy tube placement. Restrictions  Restrictions/Precautions  Restrictions/Precautions: Fall Risk  Required Braces or Orthoses?: No  Position Activity Restriction  Other position/activity restrictions:  Up with assistance  Vision/Hearing  Vision: Within Functional Limits  Hearing: Within functional limits     Subjective  General  Patient assessed for rehabilitation services?: Yes  Response To Previous Treatment: Not applicable  Family / Caregiver Present: No  Follows Commands: Within Functional Limits  Pain Screening  Patient Currently in Pain: Denies  Vital Signs  Patient Currently in Pain: Denies       Orientation  Orientation  Overall Orientation Status: Within Normal Limits    Social/Functional History  Social/Functional History  Lives With: Other (comment)  Type of Home: Facility  Home Layout: One level  Home Equipment: Rolling

## 2018-03-12 NOTE — PROGRESS NOTES
INTENSIVE CARE UNIT  Resident Physician Progress Note    Patient - Parks Shock  Date of Admission -  3/8/2018  7:33 AM  Date of Evaluation -  3/12/2018  Room and Bed Number -  [address] Day - 4    Cc- acute on chr respiratory failure   SUBJECTIVE:     OVERNIGHT EVENTS:      Pt continues to be on Vent , unable to wean off   Pt has moderate tan colored secretions  from trach , not foul smelling   Low grade fever, tmax 100.1   Pt is awake but drowsy     AWAKE & FOLLOWING COMMANDS:  [] No   [x] Yes    SECRETIONS Amount:  [] Small [x] Moderate  [] Large  [] None  Color:     [x] White [] Colored  [] Bloody    SEDATION:  RAAS Score:  [] Propofol gtt  [] Versed gtt  [] Ativan gtt   [x] No Sedation    PARALYZED:  [x] No    [] Yes    VASOPRESSORS:  [x] No    [] Yes  [] Levophed [] Dopamine [] Vasopressin  [] Dobutamine [] Phenylephrine [] Epinephrine      OBJECTIVE:     VITAL SIGNS:  /62   Pulse 80   Temp 98.5 °F (36.9 °C) (Oral)   Resp 24   Ht 5' 4\" (1.626 m)   Wt (!) 310 lb 6.5 oz (140.8 kg)   SpO2 93%   BMI 53.28 kg/m²   Tmax over 24 hours:  Temp (24hrs), Av.2 °F (37.3 °C), Min:98.5 °F (36.9 °C), Max:100 °F (37.8 °C)      Patient Vitals for the past 8 hrs:   BP Temp Temp src Pulse Resp SpO2   18 0859 - - - 80 24 93 %   18 0852 - - - - 25 93 %   18 0730 129/62 98.5 °F (36.9 °C) Oral 79 22 92 %   18 0440 (!) 110/51 99.2 °F (37.3 °C) - 82 23 94 %   18 0305 - - - 75 21 93 %         Intake/Output Summary (Last 24 hours) at 18 1103  Last data filed at 18 0500   Gross per 24 hour   Intake              350 ml   Output              600 ml   Net             -250 ml       Date 18 0000 - 18 2359   Shift 5050-8886 8549-7986 3135-8739 24 Hour Total   I  N  T  A  K  E   P.O.  (mL/kg/hr) 350  (0.3)   350    Shift Total  (mL/kg) 350  (2.5)   350  (2.5)   O  U  T  P  U  T   Urine  (mL/kg/hr) 300  (0.3)   300    Shift Total  (mL/kg) 300  (2.1) Lab Results   Component Value Date    MG 2.0 03/08/2018    MG 2.3 03/05/2018    MG 2.2 10/08/2016     Phosphorus:   Lab Results   Component Value Date    PHOS 2.0 03/08/2018    PHOS 2.5 10/08/2016    PHOS 3.6 07/20/2016     Ionized Calcium:   Lab Results   Component Value Date    CAION 1.14 03/08/2018    CAION 4.80 12/16/2013    CAION 4.81 12/15/2013        Urinalysis:   Lab Results   Component Value Date    NITRU NEGATIVE 03/08/2018    COLORU YELLOW 03/08/2018    PHUR >9.0 03/08/2018    WBCUA 10 TO 20 03/08/2018    RBCUA 20 TO 50 03/08/2018    MUCUS NOT REPORTED 03/08/2018    TRICHOMONAS NOT REPORTED 03/08/2018    YEAST NOT REPORTED 03/08/2018    BACTERIA MODERATE 03/08/2018    SPECGRAV 1.023 03/08/2018    LEUKOCYTESUR MODERATE 03/08/2018    UROBILINOGEN Normal 03/08/2018    BILIRUBINUR NEGATIVE 03/08/2018    GLUCOSEU NEGATIVE 03/08/2018    KETUA NEGATIVE 03/08/2018    AMORPHOUS NOT REPORTED 03/08/2018       HgBA1c:    Lab Results   Component Value Date    LABA1C 5.3 12/08/2017     TSH:    Lab Results   Component Value Date    TSH 1.62 03/05/2018     Lactic Acid:   Lab Results   Component Value Date    LACTA NOT REPORTED 03/08/2018    LACTA 0.7 01/08/2018      Troponin: No results for input(s): TROPONINI in the last 72 hours. Microbiology:  CULTURE BLOOD #1 [[id number]] Collected: 03/08/18 0842   Order Status: Completed Specimen: Blood Updated: 03/09/18 1050    Specimen Description . BLOOD    Special Requests LT THUMB 7ML    Culture NO GROWTH 21 HOURS    Culture [address] [phone]    Status Pending   Culture blood #2 [[id number]] Collected: 03/08/18 0842   Order Status: Completed Specimen: Blood Updated: 03/09/18 1050    Specimen Description . BLOOD    Special Requests lt hand 7 ml    Culture NO GROWTH 1 DAY    Culture [address] [phone]    Status Pending   RESPIRATORY CULTURE [[id number]] (Abnormal) Collected: 03/08/18  These findings are most consistent with  edema however an underlying inflammatory process or infection should also be  considered in the appropriate clinical setting.  These findings have  progressed since the most recent exam.         ASSESSMENT:     Patient Active Problem List    Diagnosis Date Noted    Bibasilar pneumonia - aspiration vs HCAP 03/08/2018    [facility] respiratory failure with [facility]capnia ([facility] 03/08/2018    [facility] ([facility] 03/08/2018    Chronic Atrial Fibrillation 03/08/2018    Urinary tract infection with hematuria 03/08/2018    Bipolar disorder ([facility] 03/08/2018    Hyperglycemia 03/08/2018    Tr[facility] obstruction ([facility]     Hypoxemia 48/47/3107    Metabolic Alkalosis secondary chronic respiratory failure 12/13/2013    Respiratory failure ([facility] 12/09/2013    NSTEMI (non-ST elevated myocardial infarction) ([facility] 12/09/2013    Cellulitis of leg, left 12/09/2013    CHF (congestive heart failure) ([facility] 12/09/2013          PLAN:     WEAN PER PROTOCOL:  [] No   [x] Yes  [] N/A    ICU PROPHYLAXIS:  Stress ulcer:  [] PPI Agent  [x] [id number] [] Sucralfate  [] Other:  VTE:   [x] Enoxaparin  [] Unfract. Heparin Subcut  [] EPC Cuffs    NUTRITION:  [x] NPO [] Tube Feeding (Specify: ) [] TPN  [] PO    HOME MEDS RECONCILED: [x] No  [] Yes    CONSULTATION NEEDED:  [x] No  [] Yes    FAMILY UPDATED:    [x] No  [] Yes            Additional Assessment:  Principal Problem:    Acute respiratory failure with hypercapnia (HCC)  Active Problems:    Cellulitis of leg, left    Metabolic Alkalosis secondary chronic respiratory failure    Bibasilar pneumonia - aspiration vs HCAP    Tracheostomy present (HCC)    Chronic Atrial Fibrillation    Urinary tract infection with hematuria    Bipolar disorder ([facility]    Hyperglycemia  Resolved Problems:    * No resolved hospital problems.  *      Plan:  CT chest ordered by primary to rule out effusion/ any other pathology   Check pro bnp , echo in 1/2018 suggestive

## 2018-03-12 NOTE — PROGRESS NOTES
Ht 5' 4\" (1.626 m)   Wt (!) 310 lb 6.5 oz (140.8 kg)   SpO2 93%   BMI 53.28 kg/m²      · General appearance: morbid obese , anxiuois   · HEENT: Head: Normocephalic, no lesions, without obvious abnormality. · Lungs: dimished breath sounds , basilar crackles   · Heart: regular rate and rhythm, S1, S2 normal, no murmur, click, rub or gallop  · Abdomen: soft, non-tender; bowel sounds normal; no masses,  no organomegaly  · Extremities: extremities normal, atraumatic, no cyanosis or edema  · Neurological: Gait normal. Reflexes normal and symmetric. Sensation grossly normal  · Skin - no rash, no lump   · Eye no icterus no redness  · Psych- anxiuos   · NEURO-no limb weakness  No facial droop  · Lymphatic system-no lymphadenopathy no splenomegaly     DIAGNOSTICS      Laboratory Testing:  CBC:   Recent Labs      03/12/18   0529   WBC  4.7   HGB  8.6*   PLT  See Reflexed IPF Result     BMP:    Recent Labs      03/10/18   0500  03/11/18   0543  03/12/18   0529   NA  141  138  140   K  3.6*  4.4  4.0   CL  104  97*  95*   CO2  32*  37*  38*   BUN  8  8  10   CREATININE  0.33*  0.37*  0.42*   GLUCOSE  94  115*  96     S. Calcium:  Recent Labs      03/12/18   0529   CALCIUM  8.8     S. Ionized Calcium:No results for input(s): IONCA in the last 72 hours. S. Magnesium:No results for input(s): MG in the last 72 hours. S. Phosphorus:No results for input(s): PHOS in the last 72 hours. S. Glucose:  Recent Labs      03/11/18   1533  03/11/18   1918  03/12/18   0628   POCGLU  137*  111*  122*     Glycosylated hemoglobin A1C: No results for input(s): LABA1C in the last 72 hours. INR: No results for input(s): INR in the last 72 hours. Hepatic functions:   Recent Labs      03/10/18   0500   ALKPHOS  41   ALT  8   AST  14   PROT  4.9*   BILITOT  0.28*   LABALBU  2.3*     Pancreatic functions:No results for input(s): LACTA, AMYLASE in the last 72 hours. S. Lactic Acid: No results for input(s): LACTA in the last 72 hours.   Cardiac enzymes:No results for input(s): CKTOTAL, CKMB, CKMBINDEX, TROPONINI in the last 72 hours. BNP:No results for input(s): BNP in the last 72 hours. Lipid profile: No results for input(s): CHOL, TRIG, HDL, LDLCALC in the last 72 hours. Invalid input(s): LDL  Blood Gases: No results found for: PH, PCO2, PO2, HCO3, O2SAT  Thyroid functions:   Lab Results   Component Value Date    TSH 1.62 03/05/2018        Imaging/Diagonstics:      CXR: No acute cardiopulmonary findings. ASSESSMENT     Patient Active Problem List   Diagnosis    [facility]    NSTEMI (non-ST elevated myocardial infarction) ([facility]    Cellulitis of leg, left    CHF (congestive heart failure) (HCC)    Metabolic Alkalosis secondary chronic respiratory failure    Bibasilar pneumonia - aspiration vs HCAP    Hypoxemia    Tracheostomy obstruction (HCC)    Acute respiratory failure with hypercapnia (HCC)    Tracheostomy present ([facility]    Chronic Atrial Fibrillation    Urinary tract infection with hematuria    Bipolar disorder ([facility]    Hyperglycemia       PLAN      1. Will try to wean off vent   2. On ativan q 4 for anxiety   3. Will do  Ct chest to r/o any other pathology causing failure to wean   4. continue levaquin 750 mg for total 7 days   5. Start venofer 200 mg iv for 3 doses   6. dvt prophylaxis       MD LAZARA MotleySc[facility]  [address] [facility], [address] [place] [address]. Phone [phone]   Fax: [phone]  Answering Service: [phone]    I have discussed the care of Shine Ricci , including pertinent history and exam findings,    today with the resident. I have seen and examined the patient and the key elements of all parts of the encounter have been performed by me . I agree with the assessment, plan and orders as documented by the resident.      Principal Problem:    Acute respiratory failure with hypercapnia (HCC)  Active Problems:    Cellulitis of leg, left    Metabolic Alkalosis secondary

## 2018-03-12 NOTE — PLAN OF CARE
Problem: Skin Integrity:  Goal: Absence of new skin breakdown  Absence of new skin breakdown   Outcome: Ongoing  Monitor skin every shift , frequent repositioning, special mattress in place. Problem: Pain:  Goal: Control of acute pain  Control of acute pain    Outcome: Ongoing  Pt able to call out for pain meds as needed. Problem: Fluid Volume - Imbalance:  Goal: Absence of imbalanced fluid volume signs and symptoms  Absence of imbalanced fluid volume signs and symptoms   Outcome: Ongoing  Monitor vs, I/O, labs, con't meds and diet per order. Problem: Falls - Risk of  Goal: Absence of falls  Outcome: Ongoing  No falls , nonambulatory. telesitter at bedside.

## 2018-03-13 LAB
ABSOLUTE EOS #: 0.1 K/UL (ref 0–0.4)
ABSOLUTE IMMATURE GRANULOCYTE: 0 K/UL (ref 0–0.3)
ABSOLUTE LYMPH #: 1.3 K/UL (ref 1–4.8)
ABSOLUTE MONO #: 0.1 K/UL (ref 0.1–0.8)
ANION GAP SERPL CALCULATED.3IONS-SCNC: 6 MMOL/L (ref 9–17)
BASOPHILS # BLD: 0 % (ref 0–2)
BASOPHILS ABSOLUTE: 0 K/UL (ref 0–0.2)
BUN BLDV-MCNC: 10 MG/DL (ref 8–23)
BUN/CREAT BLD: ABNORMAL (ref 9–20)
CALCIUM SERPL-MCNC: 9.3 MG/DL (ref 8.6–10.4)
CHLORIDE BLD-SCNC: 97 MMOL/L (ref 98–107)
CO2: 38 MMOL/L (ref 20–31)
CREAT SERPL-MCNC: 0.37 MG/DL (ref 0.5–0.9)
DIFFERENTIAL TYPE: ABNORMAL
EOSINOPHILS RELATIVE PERCENT: 2 % (ref 1–4)
GFR AFRICAN AMERICAN: >60 ML/MIN
GFR NON-AFRICAN AMERICAN: >60 ML/MIN
GFR SERPL CREATININE-BSD FRML MDRD: ABNORMAL ML/MIN/{1.73_M2}
GFR SERPL CREATININE-BSD FRML MDRD: ABNORMAL ML/MIN/{1.73_M2}
GLUCOSE BLD-MCNC: 125 MG/DL (ref 70–99)
HCT VFR BLD CALC: 28.8 % (ref 36.3–47.1)
HEMOGLOBIN: 8 G/DL (ref 11.9–15.1)
IMMATURE GRANULOCYTES: 0 %
LYMPHOCYTES # BLD: 27 % (ref 24–44)
MCH RBC QN AUTO: 23.3 PG (ref 25.2–33.5)
MCHC RBC AUTO-ENTMCNC: 28.1 G/DL (ref 28.4–34.8)
MCV RBC AUTO: 83.7 FL (ref 82.6–102.9)
MONOCYTES # BLD: 2 % (ref 1–7)
MORPHOLOGY: ABNORMAL
NRBC AUTOMATED: 0 PER 100 WBC
PDW BLD-RTO: 16.9 % (ref 11.8–14.4)
PLATELET # BLD: 130 K/UL (ref 138–453)
PLATELET ESTIMATE: ABNORMAL
PMV BLD AUTO: 12.7 FL (ref 8.1–13.5)
POTASSIUM SERPL-SCNC: 4.2 MMOL/L (ref 3.7–5.3)
RBC # BLD: 3.44 M/UL (ref 3.95–5.11)
RBC # BLD: ABNORMAL 10*6/UL
SEG NEUTROPHILS: 69 % (ref 36–66)
SEGMENTED NEUTROPHILS ABSOLUTE COUNT: 3.3 K/UL (ref 1.8–7.7)
SODIUM BLD-SCNC: 141 MMOL/L (ref 135–144)
WBC # BLD: 4.8 K/UL (ref 3.5–11.3)
WBC # BLD: ABNORMAL 10*3/UL

## 2018-03-13 PROCEDURE — 85025 COMPLETE CBC W/AUTO DIFF WBC: CPT

## 2018-03-13 PROCEDURE — 99233 SBSQ HOSP IP/OBS HIGH 50: CPT | Performed by: INTERNAL MEDICINE

## 2018-03-13 PROCEDURE — 2580000003 HC RX 258: Performed by: HOSPITALIST

## 2018-03-13 PROCEDURE — 6360000002 HC RX W HCPCS: Performed by: STUDENT IN AN ORGANIZED HEALTH CARE EDUCATION/TRAINING PROGRAM

## 2018-03-13 PROCEDURE — 6360000002 HC RX W HCPCS: Performed by: INTERNAL MEDICINE

## 2018-03-13 PROCEDURE — 94003 VENT MGMT INPAT SUBQ DAY: CPT

## 2018-03-13 PROCEDURE — 6370000000 HC RX 637 (ALT 250 FOR IP): Performed by: HOSPITALIST

## 2018-03-13 PROCEDURE — 94770 HC ETCO2 MONITOR DAILY: CPT

## 2018-03-13 PROCEDURE — 2060000000 HC ICU INTERMEDIATE R&B

## 2018-03-13 PROCEDURE — 6370000000 HC RX 637 (ALT 250 FOR IP): Performed by: INTERNAL MEDICINE

## 2018-03-13 PROCEDURE — 2580000003 HC RX 258: Performed by: INTERNAL MEDICINE

## 2018-03-13 PROCEDURE — 6360000002 HC RX W HCPCS: Performed by: HOSPITALIST

## 2018-03-13 PROCEDURE — 80048 BASIC METABOLIC PNL TOTAL CA: CPT

## 2018-03-13 PROCEDURE — 6370000000 HC RX 637 (ALT 250 FOR IP): Performed by: EMERGENCY MEDICINE

## 2018-03-13 PROCEDURE — 94762 N-INVAS EAR/PLS OXIMTRY CONT: CPT

## 2018-03-13 PROCEDURE — 36415 COLL VENOUS BLD VENIPUNCTURE: CPT

## 2018-03-13 PROCEDURE — 94640 AIRWAY INHALATION TREATMENT: CPT

## 2018-03-13 PROCEDURE — 76937 US GUIDE VASCULAR ACCESS: CPT

## 2018-03-13 RX ADMIN — ALBUTEROL SULFATE 2.5 MG: 2.5 SOLUTION RESPIRATORY (INHALATION) at 02:52

## 2018-03-13 RX ADMIN — IPRATROPIUM BROMIDE AND ALBUTEROL SULFATE 1 AMPULE: .5; 3 SOLUTION RESPIRATORY (INHALATION) at 19:33

## 2018-03-13 RX ADMIN — MAGNESIUM HYDROXIDE 30 ML: 400 SUSPENSION ORAL at 12:28

## 2018-03-13 RX ADMIN — QUETIAPINE FUMARATE 25 MG: 25 TABLET ORAL at 20:53

## 2018-03-13 RX ADMIN — Medication 10 ML: at 08:15

## 2018-03-13 RX ADMIN — ASPIRIN 81 MG: 81 TABLET, CHEWABLE ORAL at 08:14

## 2018-03-13 RX ADMIN — IPRATROPIUM BROMIDE AND ALBUTEROL SULFATE 1 AMPULE: .5; 3 SOLUTION RESPIRATORY (INHALATION) at 10:05

## 2018-03-13 RX ADMIN — Medication 10 ML: at 20:53

## 2018-03-13 RX ADMIN — ARIPIPRAZOLE 10 MG: 10 TABLET ORAL at 08:14

## 2018-03-13 RX ADMIN — Medication 3 MG: at 20:52

## 2018-03-13 RX ADMIN — DESMOPRESSIN ACETATE 40 MG: 0.2 TABLET ORAL at 20:53

## 2018-03-13 RX ADMIN — LORAZEPAM 1 MG: 2 INJECTION INTRAMUSCULAR; INTRAVENOUS at 08:15

## 2018-03-13 RX ADMIN — IPRATROPIUM BROMIDE AND ALBUTEROL SULFATE 1 AMPULE: .5; 3 SOLUTION RESPIRATORY (INHALATION) at 14:11

## 2018-03-13 RX ADMIN — MICONAZORB AF ANTIFUNGAL POWDER: 1.42 POWDER TOPICAL at 08:14

## 2018-03-13 RX ADMIN — FUROSEMIDE 20 MG: 20 TABLET ORAL at 08:14

## 2018-03-13 RX ADMIN — SERTRALINE 150 MG: 50 TABLET, FILM COATED ORAL at 20:53

## 2018-03-13 RX ADMIN — IRON SUCROSE 200 MG: 20 INJECTION, SOLUTION INTRAVENOUS at 10:35

## 2018-03-13 RX ADMIN — FAMOTIDINE 20 MG: 20 TABLET, FILM COATED ORAL at 20:52

## 2018-03-13 RX ADMIN — FAMOTIDINE 20 MG: 20 TABLET, FILM COATED ORAL at 08:14

## 2018-03-13 RX ADMIN — LORAZEPAM 1 MG: 2 INJECTION INTRAMUSCULAR; INTRAVENOUS at 15:15

## 2018-03-13 RX ADMIN — MICONAZORB AF ANTIFUNGAL POWDER: 1.42 POWDER TOPICAL at 20:53

## 2018-03-13 RX ADMIN — LEVOFLOXACIN 750 MG: 5 INJECTION, SOLUTION INTRAVENOUS at 08:26

## 2018-03-13 ASSESSMENT — PAIN SCALES - GENERAL: PAINLEVEL_OUTOF10: 0

## 2018-03-13 ASSESSMENT — PULMONARY FUNCTION TESTS
PIF_VALUE: 29
PIF_VALUE: 29
PIF_VALUE: 28
PIF_VALUE: 27
PIF_VALUE: 28
PIF_VALUE: 29

## 2018-03-13 NOTE — PROGRESS NOTES
[address] [facility].    PROGRESS NOTE             Date:   3/12/2018  Patient name:  Dm Bateman  Date of admission:  3/8/2018  7:33 AM  MRN:   [id number]  YOB: 1955    CHIEF COMPLAINT     Altered mental status     HISTORY OF PRESENT ILLNESS      The patient is a 59 y.o.  female who is nursing home resident  admitted to the hospital for altered mental status. She has past medical history of chronic tracheostomy, COPD, HTN, AFIB, Bipolar disorder. She was found unresponsive at nursing home at midnight. Last well known time was 8 pm. nitially taken to [place] [facility]. CXR was concerning of pneumonia and vascular congestion. CT Head was negative for acute changes. Transferred to ICU for further management. 3/13/2018  Vitals stable  Pt weaned for 15 min am  Awake and following commands     PAST MEDICAL HISTORY       has a past medical history of Asthma; Bipolar 1 disorder ([address] CAD (coronary artery disease); Cellulitis of leg, left; Cervicalgia; CHF (congestive heart failure) ([address] Dextrocardia; Hyperlipidemia; Hypertension; MRSA (methicillin resistant staph aureus) culture positive; Pickwickian syndrome ([address] and Umbilical hernia. PAST SURGICAL HISTORY       has a past surgical history that includes tracheostomy and Gastrostomy tube placement. HOME MEDICATIONS        Prior to Admission medications    Medication Sig Start Date End Date Taking?  Authorizing Provider   ARIPiprazole (ABILIFY) 10 MG tablet Take 1 tablet by mouth daily 1/12/18   Homa Horton MD   ipratropium-albuterol (DUONEB) 0.5-2.5 (3) MG/3ML SOLN nebulizer solution Inhale 1 vial into the lungs every 6 hours as needed for Shortness of Breath    Historical Provider, MD   acetaminophen (TYLENOL) 325 MG tablet Take 650 mg by mouth every 4 hours as needed for Pain    Historical Provider, MD   aspirin 81 MG tablet Take 81 mg by mouth daily    Historical Provider, MD   LORazepam (ATIVAN) 1 MG tablet Take 1 mg by mouth every 6 hours as needed for Anxiety. Historical Provider, MD   Probiotic Product (BACID PO) Take 1 tablet by mouth daily    Historical Provider, MD   Pseudoephedrine-Acetaminophen (CEPACOL SORE THROAT PO) Take 1 tablet by mouth    Historical Provider, MD   carbamide peroxide (DEBROX) 6.5 % otic solution Place 5 drops into both ears 2 times daily 1/10/18   Historical Provider, MD   fluticasone (FLONASE) 50 MCG/ACT nasal spray 2 sprays by Nasal route every 12 hours    Historical Provider, MD   loperamide (IMODIUM) 1 MG/7.5ML SUSP suspension Take 2 mg by mouth every 6 hours as needed    Historical Provider, MD   polyethylene glycol (GLYCOLAX) powder Take 17 g by mouth daily as needed    Historical Provider, MD   nitroGLYCERIN (NITROSTAT) 0.4 MG SL tablet Place 0.4 mg under the tongue every 5 minutes as needed for Chest pain up to max of 3 total doses. If no relief after 1 dose, call 911. Historical Provider, MD   nystatin (MYCOSTATIN) POWD powder Apply 1 each topically 2 times daily    Historical Provider, MD   Dextromethorphan-guaiFENesin  MG/5ML SYRP Take 5 mLs by mouth every 4 hours as needed for Cough    Historical Provider, MD   sertraline (ZOLOFT) 100 MG tablet Take 150 mg by mouth nightly    Historical Provider, MD   albuterol (PROVENTIL HFA;VENTOLIN HFA) 108 (90 BASE) MCG/ACT inhaler Inhale 4 puffs into the lungs every 4 hours as needed for Wheezing. 12/23/13   Errol Johansen MD   atorvastatin (LIPITOR) 40 MG tablet 1 tablet by Per NG tube route nightly. 12/23/13   Errol Johansen MD   risperiDONE (RISPERDAL) 3 MG tablet 1 tablet by Per NG tube route daily. 12/23/13   rErol Johansen MD   lisinopril (PRINIVIL;ZESTRIL) 2.5 MG tablet 1 tablet by Per NG tube route daily. 12/23/13   Errol Johansen MD   nystatin (MYCOSTATIN) powder Apply to affected areas 12/23/13   Errol Johansen MD   furosemide (LASIX) 40 MG tablet 1 tablet by Per NG tube route daily.  12/23/13

## 2018-03-14 ENCOUNTER — APPOINTMENT (OUTPATIENT)
Dept: GENERAL RADIOLOGY | Age: 63
DRG: 207 | End: 2018-03-14
Attending: INTERNAL MEDICINE
Payer: COMMERCIAL

## 2018-03-14 LAB
ABSOLUTE EOS #: 0.16 K/UL (ref 0–0.44)
ABSOLUTE IMMATURE GRANULOCYTE: 0.05 K/UL (ref 0–0.3)
ABSOLUTE LYMPH #: 1.64 K/UL (ref 1.1–3.7)
ABSOLUTE MONO #: 0.51 K/UL (ref 0.1–1.2)
ANION GAP SERPL CALCULATED.3IONS-SCNC: 10 MMOL/L (ref 9–17)
BASOPHILS # BLD: 0 % (ref 0–2)
BASOPHILS ABSOLUTE: <0.03 K/UL (ref 0–0.2)
BUN BLDV-MCNC: 8 MG/DL (ref 8–23)
BUN/CREAT BLD: ABNORMAL (ref 9–20)
CALCIUM SERPL-MCNC: 9.6 MG/DL (ref 8.6–10.4)
CHLORIDE BLD-SCNC: 98 MMOL/L (ref 98–107)
CO2: 37 MMOL/L (ref 20–31)
CREAT SERPL-MCNC: 0.36 MG/DL (ref 0.5–0.9)
CULTURE: NORMAL
DIFFERENTIAL TYPE: ABNORMAL
EOSINOPHILS RELATIVE PERCENT: 2 % (ref 1–4)
GFR AFRICAN AMERICAN: >60 ML/MIN
GFR NON-AFRICAN AMERICAN: >60 ML/MIN
GFR SERPL CREATININE-BSD FRML MDRD: ABNORMAL ML/MIN/{1.73_M2}
GFR SERPL CREATININE-BSD FRML MDRD: ABNORMAL ML/MIN/{1.73_M2}
GLUCOSE BLD-MCNC: 118 MG/DL (ref 70–99)
HCT VFR BLD CALC: 27.8 % (ref 36.3–47.1)
HEMOGLOBIN: 8.2 G/DL (ref 11.9–15.1)
IMMATURE GRANULOCYTES: 1 %
LYMPHOCYTES # BLD: 21 % (ref 24–43)
Lab: NORMAL
Lab: NORMAL
MCH RBC QN AUTO: 24.1 PG (ref 25.2–33.5)
MCHC RBC AUTO-ENTMCNC: 29.5 G/DL (ref 28.4–34.8)
MCV RBC AUTO: 81.8 FL (ref 82.6–102.9)
MONOCYTES # BLD: 7 % (ref 3–12)
NRBC AUTOMATED: 0 PER 100 WBC
PDW BLD-RTO: 16.9 % (ref 11.8–14.4)
PLATELET # BLD: 133 K/UL (ref 138–453)
PLATELET ESTIMATE: ABNORMAL
PMV BLD AUTO: 12.1 FL (ref 8.1–13.5)
POTASSIUM SERPL-SCNC: 3.7 MMOL/L (ref 3.7–5.3)
RBC # BLD: 3.4 M/UL (ref 3.95–5.11)
RBC # BLD: ABNORMAL 10*6/UL
SEG NEUTROPHILS: 70 % (ref 36–65)
SEGMENTED NEUTROPHILS ABSOLUTE COUNT: 5.4 K/UL (ref 1.5–8.1)
SODIUM BLD-SCNC: 145 MMOL/L (ref 135–144)
SPECIMEN DESCRIPTION: NORMAL
SPECIMEN DESCRIPTION: NORMAL
STATUS: NORMAL
STATUS: NORMAL
WBC # BLD: 7.8 K/UL (ref 3.5–11.3)
WBC # BLD: ABNORMAL 10*3/UL

## 2018-03-14 PROCEDURE — 6360000002 HC RX W HCPCS: Performed by: INTERNAL MEDICINE

## 2018-03-14 PROCEDURE — 6360000002 HC RX W HCPCS: Performed by: HOSPITALIST

## 2018-03-14 PROCEDURE — 80048 BASIC METABOLIC PNL TOTAL CA: CPT

## 2018-03-14 PROCEDURE — 2580000003 HC RX 258: Performed by: HOSPITALIST

## 2018-03-14 PROCEDURE — 2580000003 HC RX 258: Performed by: INTERNAL MEDICINE

## 2018-03-14 PROCEDURE — 6370000000 HC RX 637 (ALT 250 FOR IP): Performed by: HOSPITALIST

## 2018-03-14 PROCEDURE — 85025 COMPLETE CBC W/AUTO DIFF WBC: CPT

## 2018-03-14 PROCEDURE — 2060000000 HC ICU INTERMEDIATE R&B

## 2018-03-14 PROCEDURE — 6370000000 HC RX 637 (ALT 250 FOR IP): Performed by: INTERNAL MEDICINE

## 2018-03-14 PROCEDURE — 94762 N-INVAS EAR/PLS OXIMTRY CONT: CPT

## 2018-03-14 PROCEDURE — 94003 VENT MGMT INPAT SUBQ DAY: CPT

## 2018-03-14 PROCEDURE — 94640 AIRWAY INHALATION TREATMENT: CPT

## 2018-03-14 PROCEDURE — 6360000002 HC RX W HCPCS: Performed by: STUDENT IN AN ORGANIZED HEALTH CARE EDUCATION/TRAINING PROGRAM

## 2018-03-14 PROCEDURE — 36415 COLL VENOUS BLD VENIPUNCTURE: CPT

## 2018-03-14 PROCEDURE — 99291 CRITICAL CARE FIRST HOUR: CPT | Performed by: INTERNAL MEDICINE

## 2018-03-14 PROCEDURE — 99232 SBSQ HOSP IP/OBS MODERATE 35: CPT | Performed by: INTERNAL MEDICINE

## 2018-03-14 PROCEDURE — 6370000000 HC RX 637 (ALT 250 FOR IP): Performed by: EMERGENCY MEDICINE

## 2018-03-14 PROCEDURE — 71045 X-RAY EXAM CHEST 1 VIEW: CPT

## 2018-03-14 PROCEDURE — 99232 SBSQ HOSP IP/OBS MODERATE 35: CPT | Performed by: FAMILY MEDICINE

## 2018-03-14 PROCEDURE — 94770 HC ETCO2 MONITOR DAILY: CPT

## 2018-03-14 RX ADMIN — IPRATROPIUM BROMIDE AND ALBUTEROL SULFATE 1 AMPULE: .5; 3 SOLUTION RESPIRATORY (INHALATION) at 19:43

## 2018-03-14 RX ADMIN — Medication 10 ML: at 12:14

## 2018-03-14 RX ADMIN — IPRATROPIUM BROMIDE AND ALBUTEROL SULFATE 1 AMPULE: .5; 3 SOLUTION RESPIRATORY (INHALATION) at 13:28

## 2018-03-14 RX ADMIN — IPRATROPIUM BROMIDE AND ALBUTEROL SULFATE 1 AMPULE: .5; 3 SOLUTION RESPIRATORY (INHALATION) at 07:53

## 2018-03-14 RX ADMIN — SERTRALINE 150 MG: 50 TABLET, FILM COATED ORAL at 21:35

## 2018-03-14 RX ADMIN — IRON SUCROSE 200 MG: 20 INJECTION, SOLUTION INTRAVENOUS at 10:39

## 2018-03-14 RX ADMIN — MICONAZORB AF ANTIFUNGAL POWDER: 1.42 POWDER TOPICAL at 10:38

## 2018-03-14 RX ADMIN — ALBUTEROL SULFATE 2.5 MG: 2.5 SOLUTION RESPIRATORY (INHALATION) at 03:30

## 2018-03-14 RX ADMIN — FUROSEMIDE 20 MG: 20 TABLET ORAL at 12:11

## 2018-03-14 RX ADMIN — QUETIAPINE FUMARATE 25 MG: 25 TABLET ORAL at 21:35

## 2018-03-14 RX ADMIN — ASPIRIN 81 MG: 81 TABLET, CHEWABLE ORAL at 12:11

## 2018-03-14 RX ADMIN — ARIPIPRAZOLE 10 MG: 10 TABLET ORAL at 10:38

## 2018-03-14 RX ADMIN — FAMOTIDINE 20 MG: 20 TABLET, FILM COATED ORAL at 10:39

## 2018-03-14 RX ADMIN — LORAZEPAM 1 MG: 2 INJECTION INTRAMUSCULAR; INTRAVENOUS at 00:25

## 2018-03-14 RX ADMIN — FAMOTIDINE 20 MG: 20 TABLET, FILM COATED ORAL at 21:35

## 2018-03-14 RX ADMIN — LORAZEPAM 1 MG: 2 INJECTION INTRAMUSCULAR; INTRAVENOUS at 05:47

## 2018-03-14 RX ADMIN — LEVOFLOXACIN 750 MG: 5 INJECTION, SOLUTION INTRAVENOUS at 10:40

## 2018-03-14 RX ADMIN — LORAZEPAM 1 MG: 2 INJECTION INTRAMUSCULAR; INTRAVENOUS at 16:57

## 2018-03-14 RX ADMIN — Medication 10 ML: at 21:36

## 2018-03-14 RX ADMIN — LORAZEPAM 1 MG: 2 INJECTION INTRAMUSCULAR; INTRAVENOUS at 21:35

## 2018-03-14 ASSESSMENT — PULMONARY FUNCTION TESTS
PIF_VALUE: 14
PIF_VALUE: 29
PIF_VALUE: 30
PIF_VALUE: 29
PIF_VALUE: 27
PIF_VALUE: 14

## 2018-03-14 ASSESSMENT — PAIN SCALES - GENERAL
PAINLEVEL_OUTOF10: 0

## 2018-03-14 NOTE — PROGRESS NOTES
Assessments  Pulse: 80  Resp: 19  SpO2: 93 %  End Tidal CO2: 68 (%)  Position: Semi-Fiore's  Humidification Source: HME  Oral Care Completed?: Yes  Oral Care: Mouth suctioned, Mouth moisturizer, Mouthwash  Subglottic Suction Done?: No  Skin barrier applied: Yes    ABGs:   Arterial Blood Gas result:  pH 7.419; pCO2 65.3; pO2 85.5; HCO3 42.3; BE; %O2 Sat 98.   Lab Results   Component Value Date    PHART 7.381 01/09/2018    FXR0JLY 63.6 01/09/2018    PO2ART 128.0 01/09/2018    JSX1WLG 37.7 01/09/2018    GHK0JIT 44 03/08/2018    T4CSOIHH 98.4 01/09/2018    FIO2 50.0 03/08/2018         DATA:  Complete Blood Count:   Recent Labs      03/12/18   0529 03/13/18   0525 03/14/18   0649   WBC  4.7  4.8  7.8   RBC  3.71*  3.44*  3.40*   HGB  8.6*  8.0*  8.2*   HCT  31.5*  28.8*  27.8*   MCV  84.9  83.7  81.8*   MCH  23.2*  23.3*  24.1*   MCHC  27.3*  28.1*  29.5   RDW  17.0*  16.9*  16.9*   PLT  See Reflexed IPF Result  130*  133*   MPV  NOT REPORTED  12.7  12.1        Last 3 Blood Glucose:   Recent Labs      03/12/18   0529 03/13/18   0525  03/14/18   0824   GLUCOSE  96  125*  118*        PT/INR:    Lab Results   Component Value Date    PROTIME 11.4 10/08/2016    INR 1.1 10/08/2016     PTT:    Lab Results   Component Value Date    APTT 21.5 10/08/2016       Comprehensive Metabolic Profile:   Recent Labs      03/12/18   0529 03/13/18   0525  03/14/18   0824   NA  140  141  145*   K  4.0  4.2  3.7   CL  95*  97*  98   CO2  38*  38*  37*   BUN  10  10  8   CREATININE  0.42*  0.37*  0.36*   GLUCOSE  96  125*  118*   CALCIUM  8.8  9.3  9.6      Magnesium:   Lab Results   Component Value Date    MG 2.0 03/08/2018    MG 2.3 03/05/2018    MG 2.2 10/08/2016     Phosphorus:   Lab Results   Component Value Date    PHOS 2.0 03/08/2018    PHOS 2.5 10/08/2016    PHOS 3.6 07/20/2016     Ionized Calcium:   Lab Results   Component Value Date    CAION 1.14 03/08/2018    CAION 4.80 12/16/2013    CAION 4.81 12/15/2013        Urinalysis:   Lab [facility], [address] [place] [address] [phone]    Status Pending       Other Labs:  CBC with Differential:    Lab Results   Component Value Date    WBC 7.8 03/14/2018    RBC 3.40 03/14/2018    HGB 8.2 03/14/2018    HCT 27.8 03/14/2018     03/14/2018    MCV 81.8 03/14/2018    MCH 24.1 03/14/2018    MCHC 29.5 03/14/2018    RDW 16.9 03/14/2018    LYMPHOPCT 21 03/14/2018    MONOPCT 7 03/14/2018    BASOPCT 0 03/14/2018    MONOSABS 0.51 03/14/2018    LYMPHSABS 1.64 03/14/2018    EOSABS 0.16 03/14/2018    BASOSABS <0.03 03/14/2018    DIFFTYPE NOT REPORTED 03/14/2018     BMP:    Lab Results   Component Value Date     03/14/2018    K 3.7 03/14/2018    CL 98 03/14/2018    CO2 37 03/14/2018    BUN 8 03/14/2018    LABALBU 2.3 03/10/2018    CREATININE 0.36 03/14/2018    CALCIUM 9.6 03/14/2018    GFRAA >60 03/14/2018    LABGLOM >60 03/14/2018    GLUCOSE 118 03/14/2018     Hepatic Function Panel:    Lab Results   Component Value Date    ALKPHOS 41 03/10/2018    ALT 8 03/10/2018    AST 14 03/10/2018    PROT 4.9 03/10/2018    BILITOT 0.28 03/10/2018    BILIDIR <0.08 09/13/2017    IBILI CANNOT BE CALCULATED 09/13/2017    LABALBU 2.3 03/10/2018     PT/INR:    Lab Results   Component Value Date    PROTIME 11.4 10/08/2016    INR 1.1 10/08/2016     Last 3 Troponin:    Lab Results   Component Value Date    TROPONINI 0.07 12/09/2013    TROPONINI 0.12 12/09/2013    TROPONINI 0.15 12/09/2013         Radiology/Imaging:  Impression:        Suboptimal positioning.  Bilateral interstitial and alveolar opacities are  noted along with small effusions.  These findings are most consistent with  edema however an underlying inflammatory process or infection should also be  considered in the appropriate clinical setting.  These findings have  progressed since the most recent exam.         ASSESSMENT:     Patient Active Problem List    Diagnosis Date Noted    Acute metabolic encephalopathy 90/53/3061    Bibasilar pneumonia - aspiration vs HCAP 03/08/2018 Onel Bachelor, including pertinent history and exam findings,  with the resident. I have seen and examined the patient and the key elements of all parts of the encounter have been performed by me. I agree with the assessment, plan and orders as documented by the resident.    CC 35 minutes    Chi Morgan MD  3/14/2018  5:06 PM

## 2018-03-14 NOTE — PROGRESS NOTES
[address] [facility].    PROGRESS NOTE             Date:   3/14/2018  Patient name:  Jolly Schafer  Date of admission:  3/8/2018  7:33 AM  MRN:   [id number]  YOB: 1955    CHIEF COMPLAINT     Altered mental status     HISTORY OF PRESENT ILLNESS      The patient is a 59 y.o.  female who is nursing home resident  admitted to the hospital for altered mental status. She has past medical history of chronic tracheostomy, COPD, HTN, AFIB, Bipolar disorder. She was found unresponsive at nursing home at midnight. Last well known time was 8 pm. nitially taken to [facility]. CXR was concerning of pneumonia and vascular congestion. CT Head was negative for acute changes. Transferred to ICU for further management. 3/14/2018  Vitals stable  Awake and following commands   BCx and Sputum Cx negative  Pain well controlled   Secretions moderate   Patient explained about need for LTAC eventually if unable to wean of ventilator     PAST MEDICAL HISTORY       has a past medical history of Asthma; Bipolar 1 disorder ([address] CAD (coronary artery disease); Cellulitis of leg, left; Cervicalgia; CHF (congestive heart failure) ([address] Dextrocardia; Hyperlipidemia; Hypertension; MRSA (methicillin resistant staph aureus) culture positive; Pickwickian syndrome ([facility] and Umbilical hernia. PAST SURGICAL HISTORY       has a past surgical history that includes tracheostomy and Gastrostomy tube placement. HOME MEDICATIONS        Prior to Admission medications    Medication Sig Start Date End Date Taking?  Authorizing Provider   ARIPiprazole (ABILIFY) 10 MG tablet Take 1 tablet by mouth daily 1/12/18   Magdalena Saldana MD   ipratropium-albuterol (DUONEB) 0.5-2.5 (3) MG/3ML SOLN nebulizer solution Inhale 1 vial into the lungs every 6 hours as needed for Shortness of Breath    Historical Provider, MD   acetaminophen (TYLENOL) 325 MG tablet Take 650 mg by mouth

## 2018-03-14 NOTE — PLAN OF CARE
Problem: Skin Integrity:  Goal: Absence of new skin breakdown  Absence of new skin breakdown   Outcome: Met This Shift  No skin breakdown noted, mepilex applies to coccyx, reposition every 2 hours, pillow support    Problem: Falls - Risk of  Goal: Absence of falls  Outcome: Met This Shift  Patient free from falls or injuries, bed lowest position, call light within reach

## 2018-03-14 NOTE — PROGRESS NOTES
General: [x]  Oriented x3      [] well appearing      [] Intubated      [] ill appearing      [] Other:    Mental Status: [x] normal mental status exam      [] drowsy      [] Confused      [] Other:     Cardiovascular: [x]  Regular rate/rhythm      [] Arrhythmia      [] Other:     Chest: [x] Effort normal      [] lungs clear      [] respiratory distress      [] Tachypnea      []  Other: Decreased breath sounds bilaterally, few crackles and rales present    Abdomen: [x] Soft/non-tender      []  Normal appearance      [] Distended      [] Ascites      [] Other: Obese  Neurological: [] Normal Speech      [x] Normal Sensation      []  Deficits present: Patient mouth her words, speech difficult to understand due to tracheostomy    Extremity:  [x] normal skin color/temp      [] clubbing/cyanosis      []  No edema      [] Other:     Palliative Performance Scale:  ___60%  Ambulation reduced; Significant disease; Can't do hobbies/housework; intake normal or reduced; occasional assist; LOC full/confusion  ___50%  Mainly sit/lie; Extensive disease; Can't do any work; Considerable assist; intake normal or reduced; LOC full/confusion  __x_40%  Mainly in bed; Extensive disease; Mainly assist; intake normal or reduced; LOC full/confusion   ___30%  Bed Bound; Extensive disease; Total care; intake reduced; LOCfull/confusion  ___20%  Bed Bound; Extensive disease; Total care; intake minimal; Drowsy/coma  ___10%  Bed Bound; Extensive disease;  Total care; Mouth care only; Drowsy/coma  ___0       Death      Plan      Palliative Interaction:    - The patient was seen today along with the medical student Steph Matos    - The current medical conditions were discussed with the patient    - The POA paperwork was once again discussed with the patient and I informed the patient that I had met with the spiritual care  who had informed me that the patient wanted time to think before making the decision regarding who she wanted as her POA recommendations  - We will continue to provide comfort and support to the patient and the family  Please call with any palliative questions or concerns. Palliative Care Team is available via perfect serve or via phone. Patient was seen with total face-to-face time of 25 minutes. More than 50% of this visit was counseling and/or education regarding PC as well as care coordination. Palliative Care will continue to follow Ms. Anderson's care as needed. The note has been dictated by dragon, typing errors may be a possibility. Thank you for allowing Palliative Care to participate in the care of Ms. Onur Vitale .        Electronically signed by   Michelle Mccarthy MD  Palliative Care Team  on 3/14/2018 at 1:24 PM    Palliative care office: [phone]

## 2018-03-15 VITALS
OXYGEN SATURATION: 95 % | TEMPERATURE: 97.6 F | HEIGHT: 64 IN | BODY MASS INDEX: 50.02 KG/M2 | RESPIRATION RATE: 18 BRPM | SYSTOLIC BLOOD PRESSURE: 121 MMHG | WEIGHT: 293 LBS | DIASTOLIC BLOOD PRESSURE: 56 MMHG | HEART RATE: 60 BPM

## 2018-03-15 LAB
ABSOLUTE EOS #: 0.22 K/UL (ref 0–0.44)
ABSOLUTE IMMATURE GRANULOCYTE: 0.15 K/UL (ref 0–0.3)
ABSOLUTE LYMPH #: 1.55 K/UL (ref 1.1–3.7)
ABSOLUTE MONO #: 0.44 K/UL (ref 0.1–1.2)
ANION GAP SERPL CALCULATED.3IONS-SCNC: 8 MMOL/L (ref 9–17)
BASOPHILS # BLD: 0 % (ref 0–2)
BASOPHILS ABSOLUTE: 0 K/UL (ref 0–0.2)
BUN BLDV-MCNC: 7 MG/DL (ref 8–23)
BUN/CREAT BLD: ABNORMAL (ref 9–20)
CALCIUM SERPL-MCNC: 9.5 MG/DL (ref 8.6–10.4)
CHLORIDE BLD-SCNC: 95 MMOL/L (ref 98–107)
CO2: 37 MMOL/L (ref 20–31)
CREAT SERPL-MCNC: 0.44 MG/DL (ref 0.5–0.9)
CULTURE: ABNORMAL
DIFFERENTIAL TYPE: ABNORMAL
DIRECT EXAM: ABNORMAL
EOSINOPHILS RELATIVE PERCENT: 3 % (ref 1–4)
GFR AFRICAN AMERICAN: >60 ML/MIN
GFR NON-AFRICAN AMERICAN: >60 ML/MIN
GFR SERPL CREATININE-BSD FRML MDRD: ABNORMAL ML/MIN/{1.73_M2}
GFR SERPL CREATININE-BSD FRML MDRD: ABNORMAL ML/MIN/{1.73_M2}
GLUCOSE BLD-MCNC: 94 MG/DL (ref 70–99)
HCT VFR BLD CALC: 33 % (ref 36.3–47.1)
HEMOGLOBIN: 8.9 G/DL (ref 11.9–15.1)
IMMATURE GRANULOCYTES: 2 %
LYMPHOCYTES # BLD: 21 % (ref 24–43)
Lab: ABNORMAL
MCH RBC QN AUTO: 23.3 PG (ref 25.2–33.5)
MCHC RBC AUTO-ENTMCNC: 27 G/DL (ref 28.4–34.8)
MCV RBC AUTO: 86.4 FL (ref 82.6–102.9)
MONOCYTES # BLD: 6 % (ref 3–12)
MORPHOLOGY: ABNORMAL
NRBC AUTOMATED: 0 PER 100 WBC
ORGANISM: ABNORMAL
PDW BLD-RTO: 16.7 % (ref 11.8–14.4)
PLATELET # BLD: 153 K/UL (ref 138–453)
PLATELET ESTIMATE: ABNORMAL
PMV BLD AUTO: 11.1 FL (ref 8.1–13.5)
POTASSIUM SERPL-SCNC: 3.8 MMOL/L (ref 3.7–5.3)
RBC # BLD: 3.82 M/UL (ref 3.95–5.11)
RBC # BLD: ABNORMAL 10*6/UL
SEG NEUTROPHILS: 68 % (ref 36–65)
SEGMENTED NEUTROPHILS ABSOLUTE COUNT: 5.04 K/UL (ref 1.5–8.1)
SODIUM BLD-SCNC: 140 MMOL/L (ref 135–144)
SPECIMEN DESCRIPTION: ABNORMAL
STATUS: ABNORMAL
WBC # BLD: 7.4 K/UL (ref 3.5–11.3)
WBC # BLD: ABNORMAL 10*3/UL

## 2018-03-15 PROCEDURE — 94003 VENT MGMT INPAT SUBQ DAY: CPT

## 2018-03-15 PROCEDURE — 6360000002 HC RX W HCPCS: Performed by: INTERNAL MEDICINE

## 2018-03-15 PROCEDURE — 94640 AIRWAY INHALATION TREATMENT: CPT

## 2018-03-15 PROCEDURE — 6360000002 HC RX W HCPCS: Performed by: STUDENT IN AN ORGANIZED HEALTH CARE EDUCATION/TRAINING PROGRAM

## 2018-03-15 PROCEDURE — 99239 HOSP IP/OBS DSCHRG MGMT >30: CPT | Performed by: INTERNAL MEDICINE

## 2018-03-15 PROCEDURE — 36415 COLL VENOUS BLD VENIPUNCTURE: CPT

## 2018-03-15 PROCEDURE — 94770 HC ETCO2 MONITOR DAILY: CPT

## 2018-03-15 PROCEDURE — 6370000000 HC RX 637 (ALT 250 FOR IP): Performed by: EMERGENCY MEDICINE

## 2018-03-15 PROCEDURE — 99233 SBSQ HOSP IP/OBS HIGH 50: CPT | Performed by: INTERNAL MEDICINE

## 2018-03-15 PROCEDURE — 6370000000 HC RX 637 (ALT 250 FOR IP): Performed by: HOSPITALIST

## 2018-03-15 PROCEDURE — 85025 COMPLETE CBC W/AUTO DIFF WBC: CPT

## 2018-03-15 PROCEDURE — 94762 N-INVAS EAR/PLS OXIMTRY CONT: CPT

## 2018-03-15 PROCEDURE — 2580000003 HC RX 258: Performed by: HOSPITALIST

## 2018-03-15 PROCEDURE — 6370000000 HC RX 637 (ALT 250 FOR IP): Performed by: INTERNAL MEDICINE

## 2018-03-15 PROCEDURE — 80048 BASIC METABOLIC PNL TOTAL CA: CPT

## 2018-03-15 PROCEDURE — 97110 THERAPEUTIC EXERCISES: CPT

## 2018-03-15 RX ORDER — FUROSEMIDE 20 MG/1
20 TABLET ORAL DAILY
Qty: 60 TABLET | Refills: 3 | Status: SHIPPED | OUTPATIENT
Start: 2018-03-15

## 2018-03-15 RX ADMIN — MICONAZORB AF ANTIFUNGAL POWDER: 1.42 POWDER TOPICAL at 09:01

## 2018-03-15 RX ADMIN — Medication 10 ML: at 12:59

## 2018-03-15 RX ADMIN — ASPIRIN 81 MG: 81 TABLET, CHEWABLE ORAL at 09:02

## 2018-03-15 RX ADMIN — IPRATROPIUM BROMIDE AND ALBUTEROL SULFATE 1 AMPULE: .5; 3 SOLUTION RESPIRATORY (INHALATION) at 13:51

## 2018-03-15 RX ADMIN — IPRATROPIUM BROMIDE AND ALBUTEROL SULFATE 1 AMPULE: .5; 3 SOLUTION RESPIRATORY (INHALATION) at 19:30

## 2018-03-15 RX ADMIN — LEVOFLOXACIN 750 MG: 5 INJECTION, SOLUTION INTRAVENOUS at 09:02

## 2018-03-15 RX ADMIN — LORAZEPAM 1 MG: 2 INJECTION INTRAMUSCULAR; INTRAVENOUS at 09:34

## 2018-03-15 RX ADMIN — FAMOTIDINE 20 MG: 20 TABLET, FILM COATED ORAL at 09:02

## 2018-03-15 RX ADMIN — IPRATROPIUM BROMIDE AND ALBUTEROL SULFATE 1 AMPULE: .5; 3 SOLUTION RESPIRATORY (INHALATION) at 07:39

## 2018-03-15 RX ADMIN — FUROSEMIDE 20 MG: 20 TABLET ORAL at 12:58

## 2018-03-15 RX ADMIN — ARIPIPRAZOLE 10 MG: 10 TABLET ORAL at 09:02

## 2018-03-15 ASSESSMENT — PULMONARY FUNCTION TESTS
PIF_VALUE: 27
PIF_VALUE: 29

## 2018-03-15 NOTE — PLAN OF CARE
Problem: Falls - Risk of  Goal: Absence of falls  Outcome: Met This Shift  Patient free from falls or injuries, call light within reach, bed lowest position    Problem: Risk for Impaired Skin Integrity  Goal: Tissue integrity - skin and mucous membranes  Structural intactness and normal physiological function of skin and  mucous membranes.    Outcome: Met This Shift  Skin assessment completed, moisture barrier applied, patient repositioned Q 2 hours

## 2018-03-15 NOTE — CARE COORDINATION
Transitional Planning  Spoke to [facility] liaison, states pre-cert is under MD review as of 5 pm last night and nothing yet today. Continue to await pre-cert. Notified by Vignesh Chu has received pre-cert and ready to admit tonight. Transport arranged for 7:45 pm via [facility] [address]. La updated. 1615 - AVS with completed TORIN faxed. VOLODYMYR Garner updated.

## 2018-03-15 NOTE — PROGRESS NOTES
22  SpO2: 93 %  End Tidal CO2: 68 (%)  Position: Semi-Fiore's  Humidification Source: HME  Oral Care Completed?: Yes  Oral Care: Mouthwash, Suction toothette  Subglottic Suction Done?: No  Skin barrier applied: Yes    ABGs:   Arterial Blood Gas result:  pH 7.419; pCO2 65.3; pO2 85.5; HCO3 42.3; BE; %O2 Sat 98.   Lab Results   Component Value Date    PHART 7.381 01/09/2018    OQS9NWY 63.6 01/09/2018    PO2ART 128.0 01/09/2018    FVY9BKA 37.7 01/09/2018    ZDA8DPO 44 03/08/2018    J6QLLZVJ 98.4 01/09/2018    FIO2 50.0 03/08/2018         DATA:  Complete Blood Count:   Recent Labs      03/13/18   0525  03/14/18   0649  03/15/18   0629   WBC  4.8  7.8  7.4   RBC  3.44*  3.40*  3.82*   HGB  8.0*  8.2*  8.9*   HCT  28.8*  27.8*  33.0*   MCV  83.7  81.8*  86.4   MCH  23.3*  24.1*  23.3*   MCHC  28.1*  29.5  27.0*   RDW  16.9*  16.9*  16.7*   PLT  130*  133*  153   MPV  12.7  12.1  11.1        Last 3 Blood Glucose:   Recent Labs      03/13/18   0525  03/14/18   0824  03/15/18   0629   GLUCOSE  125*  118*  94        PT/INR:    Lab Results   Component Value Date    PROTIME 11.4 10/08/2016    INR 1.1 10/08/2016     PTT:    Lab Results   Component Value Date    APTT 21.5 10/08/2016       Comprehensive Metabolic Profile:   Recent Labs      03/13/18   0525  03/14/18   0824  03/15/18   0629   NA  141  145*  140   K  4.2  3.7  3.8   CL  97*  98  95*   CO2  38*  37*  37*   BUN  10  8  7*   CREATININE  0.37*  0.36*  0.44*   GLUCOSE  125*  118*  94   CALCIUM  9.3  9.6  9.5      Magnesium:   Lab Results   Component Value Date    MG 2.0 03/08/2018    MG 2.3 03/05/2018    MG 2.2 10/08/2016     Phosphorus:   Lab Results   Component Value Date    PHOS 2.0 03/08/2018    PHOS 2.5 10/08/2016    PHOS 3.6 07/20/2016     Ionized Calcium:   Lab Results   Component Value Date    CAION 1.14 03/08/2018    CAION 4.80 12/16/2013    CAION 4.81 12/15/2013        Urinalysis:   Lab Results   Component Value Date    NITRU NEGATIVE 03/08/2018    COLORU YELLOW 03/08/2018    PHUR >9.0 03/08/2018    WBCUA 10 TO 20 03/08/2018    RBCUA 20 TO 50 03/08/2018    MUCUS NOT REPORTED 03/08/2018    TRICHOMONAS NOT REPORTED 03/08/2018    YEAST NOT REPORTED 03/08/2018    BACTERIA MODERATE 03/08/2018    SPECGRAV 1.023 03/08/2018    LEUKOCYTESUR MODERATE 03/08/2018    UROBILINOGEN Normal 03/08/2018    BILIRUBINUR NEGATIVE 03/08/2018    GLUCOSEU NEGATIVE 03/08/2018    KETUA NEGATIVE 03/08/2018    AMORPHOUS NOT REPORTED 03/08/2018       HgBA1c:    Lab Results   Component Value Date    LABA1C 5.3 12/08/2017     TSH:    Lab Results   Component Value Date    TSH 1.62 03/05/2018     Lactic Acid:   Lab Results   Component Value Date    LACTA NOT REPORTED 03/08/2018    LACTA 0.7 01/08/2018      Troponin: No results for input(s): TROPONINI in the last 72 hours. Microbiology:  CULTURE BLOOD #1 [[id number]] Collected: 03/08/18 0842   Order Status: Completed Specimen: Blood Updated: 03/09/18 1050    Specimen Description . BLOOD    Special Requests LT THUMB 7ML    Culture NO GROWTH 21 HOURS    Culture [address] [phone]    Status Pending   Culture blood #2 [[id number]] Collected: 03/08/18 0842   Order Status: Completed Specimen: Blood Updated: 03/09/18 1050    Specimen Description . BLOOD    Special Requests lt hand 7 ml    Culture NO GROWTH 1 DAY    Culture [address] [phone]    Status Pending   RESPIRATORY CULTURE [[id number]] (Abnormal) Collected: 03/08/18 1155   Order Status: Completed Specimen: Sputum Expectorated Updated: 03/09/18 1010    Specimen Description . INDUCED SPUTUM    Special Requests NOT REPORTED    Direct Exam < 10 EPITHELIAL CELLS/LPF    Direct Exam >10, <25 NEUTROPHILS/LPF    Direct Exam MIXED BACTERIAL MORPHOTYPES SEEN ON GRAM STAIN.  (A)    Culture CULTURE IN PROGRESS    Culture [address] [phone]    Status Pending       Other 03/08/2018    Tracheostomy present ([facility] 03/08/2018    Chronic Atrial Fibrillation 03/08/2018    Urinary tract infection with hematuria 03/08/2018    Bipolar disorder ([facility] 03/08/2018    Tracheostomy obstruction ([facility]     Hypoxemia 89/16/3568    Metabolic Alkalosis secondary chronic respiratory failure 12/13/2013    Respiratory failure ([facility] 12/09/2013    NSTEMI (non-ST elevated myocardial infarction) ([facility] 12/09/2013    Cellulitis of leg, left 12/09/2013    CHF (congestive heart failure) ([facility] 12/09/2013          PLAN:     WEAN PER PROTOCOL:  [] No   [x] Yes  [] N/A    ICU PROPHYLAXIS:  Stress ulcer:  [] PPI Agent  [x] Q3Ekifd [] Sucralfate  [] Other:  VTE:   [x] Enoxaparin  [] Unfract.  Heparin Subcut  [] EPC Cuffs    NUTRITION:  [] NPO [] Tube Feeding (Specify: ) [] TPN  [x] PO    HOME MEDS RECONCILED: [x] No  [] Yes    CONSULTATION NEEDED:  [x] No  [] Yes    FAMILY UPDATED:    [x] No  [] Yes            Additional Assessment:  Principal Problem:    Acute respiratory failure with hypercapnia (HCC)  Active Problems:    Cellulitis of leg, left    Metabolic Alkalosis secondary chronic respiratory failure    Bibasilar pneumonia - aspiration vs HCAP    Tracheostomy present (HCC)    Chronic Atrial Fibrillation    Urinary tract infection with hematuria    Bipolar disorder ([facility]    Acute metabolic encephalopathy  Resolved Problems:    Hyperglycemia      Plan:  Dc levaquin today , pt received 7 days of antibiotics   Deescalate labs , cbc and bmp every other day   Continue duoneb   Continue trach care , try trach collar with cool aerosols today   Pt [facility] be [facility] to [facility] when the bed is available   Will continue to follow     Pasha Lopez MD  3/15/2018  10:26 AM

## 2018-03-15 NOTE — FLOWSHEET NOTE
followed up with patient who said she was going to talk to her brother during his visit today about being her MPOA.  asked patient what she would like to do and she said \"I didn't talk to him about that. I don't want to do it. \"   When asked if she wanted to name someone else MPOA patient held her hand up and said \"I don't want to do that,\" while shaking her head no.  indicated understanding her wishes and talked briefly about patient going to [place] and then [facility]. Patient may request a visit from chaplains at any time and chaplains may be paged via [address] [place] [address]. Patient anticipates going to [place] tomorrow if this is approved by insurance.        03/15/18 1101   Encounter Summary   Services provided to: Patient   Continue Visiting (3/15)   Complexity of Encounter Moderate   Length of Encounter 15 minutes   Advance Directives (For Healthcare)   Advance Directives Documents explained;Documents given;Pt. not interested at this time
DATE: 3/14/2018    NAME: Maria Teresa Carlos  MRN: [id number]   : 1955    Patient not seen this date for Physical Therapy due to:  [] Blood transfusion in progress  [] Hemodialysis  [x]  Patient Declined; pt alert in bed. Adamantly refusing all mobility. Educated pt on benefits of ROM and purpose of PT during hospital stay. Pt continuously whispering, \"I don't want therapy\". RN notified. [] Spine Precautions   [] Strict Bedrest  [] Surgery/ Procedure  [] Testing      [] Other        [] PT being discontinued at this time. Patient independent. No further needs. [] PT being discontinued at this time as the patient has been transferred to palliative care. No further needs.     Linnea aBnks, PTA
and Life Adjustment   Type Palliative care; Adjustment to illness   Assessment Anxious   Intervention Active listening;Explored feelings, thoughts, concerns   Outcome Engaged in conversation;Expressed gratitude   Advance Directives (For Healthcare)   Advance Directives Documents explained  (will think about it tonight, follow up with her tomorrow)

## 2018-03-16 NOTE — DISCHARGE SUMMARY
and also talking. Palliative care consult. Patient could not be weaned off the ventilator. Plan made in conjunction with pulmonology to transfer the patient to long-term acute care. If she remains off the ventilator by eventual plan to be present her to nursing home. Procedures:  None     Any Hospital Acquired Infections: none    Discharge Functional Status:  stable    Disposition: long term care facility    Patient Instructions:   Discharge Medication List as of 3/15/2018  4:13 PM      CONTINUE these medications which have CHANGED    Details   furosemide (LASIX) 20 MG tablet Take 1 tablet by mouth daily, Disp-60 tablet, R-3Normal         CONTINUE these medications which have NOT CHANGED    Details   ARIPiprazole (ABILIFY) 10 MG tablet Take 1 tablet by mouth daily, Disp-30 tablet, R-3Normal      ipratropium-albuterol (DUONEB) 0.5-2.5 (3) MG/3ML SOLN nebulizer solution Inhale 1 vial into the lungs every 6 hours as needed for Shortness of BreathHistorical Med      acetaminophen (TYLENOL) 325 MG tablet Take 650 mg by mouth every 4 hours as needed for PainHistorical Med      aspirin 81 MG tablet Take 81 mg by mouth dailyHistorical Med      LORazepam (ATIVAN) 1 MG tablet Take 1 mg by mouth every 6 hours as needed for Anxiety. Historical Med      Probiotic Product (BACID PO) Take 1 tablet by mouth dailyHistorical Med      Pseudoephedrine-Acetaminophen (CEPACOL SORE THROAT PO) Take 1 tablet by mouthHistorical Med      carbamide peroxide (DEBROX) 6.5 % otic solution Place 5 drops into both ears 2 times dailyHistorical Med      fluticasone (FLONASE) 50 MCG/ACT nasal spray 2 sprays by Nasal route every 12 hoursHistorical Med      loperamide (IMODIUM) 1 MG/7.5ML SUSP suspension Take 2 mg by mouth every 6 hours as neededHistorical Med      polyethylene glycol (GLYCOLAX) powder Take 17 g by mouth daily as neededHistorical Med      nitroGLYCERIN (NITROSTAT) 0.4 MG SL tablet Place 0.4 mg under the tongue every 5 minutes as needed for Chest pain up to max of 3 total doses. If no relief after 1 dose, call 911. Historical Med      nystatin (MYCOSTATIN) POWD powder Apply 1 each topically 2 times dailyHistorical Med      Dextromethorphan-guaiFENesin  MG/5ML SYRP Take 5 mLs by mouth every 4 hours as needed for CoughHistorical Med      sertraline (ZOLOFT) 100 MG tablet Take 150 mg by mouth nightlyHistorical Med      albuterol (PROVENTIL HFA;VENTOLIN HFA) 108 (90 BASE) MCG/ACT inhaler Inhale 4 puffs into the lungs every 4 hours as needed for Wheezing., Disp-1 Inhaler, R-3Print      atorvastatin (LIPITOR) 40 MG tablet 1 tablet by Per NG tube route nightly., Disp-30 tablet, R-3Print      risperiDONE (RISPERDAL) 3 MG tablet 1 tablet by Per NG tube route daily. , Disp-60 tablet, R-3Print      lisinopril (PRINIVIL;ZESTRIL) 2.5 MG tablet 1 tablet by Per NG tube route daily. , Disp-30 tablet, R-3Print      nystatin (MYCOSTATIN) powder Apply to affected areas, Disp-1 Bottle, R-3, Print      oxybutynin (DITROPAN) 5 MG tablet 1 tablet by PEG Tube route daily. , Disp-90 tablet, R-3Print      famotidine (PEPCID) 20 MG tablet 1 tablet by PEG Tube route 2 times daily. , Disp-60 tablet, R-3Print      metoprolol (LOPRESSOR) 25 MG tablet 1 tablet by PEG Tube route 2 times daily. , Disp-60 tablet, R-3Print             Activity: activity as tolerated    Diet: cardiac diet    Follow-up:    Karlos Ortega MD  [address] [place] [address].  [facility] [address]  [phone]          [address] [place] [address] [address] [phone]          Follow up labs: none     Follow up imaging: none    Note that over 30 minutes was spent in preparing discharge papers, discussing discharge with patient, medication review, etc.      Sheyla Bernal MD         Department of Internal Medicine  [facility] [place]         3/16/2018, 11:28 AM  Attending Physician Statement  I have reviewed and edited the discharge summary of

## 2018-03-18 LAB
CULTURE: 2
CULTURE: NORMAL
Lab: NORMAL
SPECIMEN DESCRIPTION: NORMAL
STATUS: NORMAL

## 2018-04-02 ENCOUNTER — HOSPITAL ENCOUNTER (OUTPATIENT)
Age: 63
Setting detail: SPECIMEN
Discharge: HOME | End: 2018-04-02
Payer: COMMERCIAL

## 2018-04-02 LAB — PLATELET # BLD: 84 K/UL (ref 130–400)

## 2018-04-02 PROCEDURE — 85049 AUTOMATED PLATELET COUNT: CPT

## 2018-04-04 ENCOUNTER — HOSPITAL ENCOUNTER (OUTPATIENT)
Age: 63
Setting detail: SPECIMEN
Discharge: HOME | End: 2018-04-04

## 2018-04-04 LAB — PLATELET # BLD: 108 K/UL (ref 130–400)

## 2018-04-04 PROCEDURE — 85049 AUTOMATED PLATELET COUNT: CPT

## 2018-04-06 ENCOUNTER — HOSPITAL ENCOUNTER (OUTPATIENT)
Age: 63
Setting detail: SPECIMEN
Discharge: HOME | End: 2018-04-06

## 2018-04-10 ENCOUNTER — HOSPITAL ENCOUNTER (OUTPATIENT)
Age: 63
Setting detail: SPECIMEN
Discharge: HOME | End: 2018-04-10
Payer: MEDICAID

## 2018-04-12 ENCOUNTER — HOSPITAL ENCOUNTER (OUTPATIENT)
Age: 63
Setting detail: SPECIMEN
Discharge: HOME | End: 2018-04-12
Payer: MEDICAID

## 2018-04-12 LAB
ALBUMIN SERPL-MCNC: 3.2 G/DL (ref 3.5–5.2)
ALBUMIN/GLOBULIN RATIO: ABNORMAL (ref 1–2.5)
ALP BLD-CCNC: 68 U/L (ref 35–104)
ALT SERPL-CCNC: 16 U/L (ref 5–33)
ANION GAP SERPL CALCULATED.3IONS-SCNC: 13 MMOL/L (ref 9–17)
AST SERPL-CCNC: 86 U/L
BILIRUB SERPL-MCNC: 0.77 MG/DL (ref 0.3–1.2)
BUN BLDV-MCNC: 11 MG/DL (ref 8–23)
BUN/CREAT BLD: 17 (ref 9–20)
CALCIUM SERPL-MCNC: 9.4 MG/DL (ref 8.6–10.4)
CHLORIDE BLD-SCNC: 99 MMOL/L (ref 98–107)
CO2: 31 MMOL/L (ref 20–31)
CREAT SERPL-MCNC: 0.63 MG/DL (ref 0.5–0.9)
GFR AFRICAN AMERICAN: >60 ML/MIN
GFR NON-AFRICAN AMERICAN: >60 ML/MIN
GFR SERPL CREATININE-BSD FRML MDRD: ABNORMAL ML/MIN/{1.73_M2}
GFR SERPL CREATININE-BSD FRML MDRD: ABNORMAL ML/MIN/{1.73_M2}
GLUCOSE BLD-MCNC: 84 MG/DL (ref 70–99)
MAGNESIUM: 2 MG/DL (ref 1.6–2.6)
PHOSPHORUS: 2 MG/DL (ref 2.6–4.5)
PLATELET # BLD: 137 K/UL (ref 130–400)
POTASSIUM SERPL-SCNC: 3.9 MMOL/L (ref 3.7–5.3)
SODIUM BLD-SCNC: 143 MMOL/L (ref 135–144)
TOTAL PROTEIN: 7.4 G/DL (ref 6.4–8.3)

## 2018-04-12 PROCEDURE — 84100 ASSAY OF PHOSPHORUS: CPT

## 2018-04-12 PROCEDURE — 85049 AUTOMATED PLATELET COUNT: CPT

## 2018-04-12 PROCEDURE — 83735 ASSAY OF MAGNESIUM: CPT

## 2018-04-12 PROCEDURE — 80053 COMPREHEN METABOLIC PANEL: CPT

## 2018-04-13 ENCOUNTER — HOSPITAL ENCOUNTER (OUTPATIENT)
Age: 63
Setting detail: SPECIMEN
Discharge: HOME | End: 2018-04-13
Payer: MEDICAID

## 2018-04-13 LAB
ABSOLUTE EOS #: 0.1 K/UL (ref 0–0.4)
ABSOLUTE IMMATURE GRANULOCYTE: ABNORMAL K/UL (ref 0–0.3)
ABSOLUTE LYMPH #: 1.2 K/UL (ref 1–4.8)
ABSOLUTE MONO #: 0.4 K/UL (ref 0.2–0.8)
ALBUMIN SERPL-MCNC: 3.7 G/DL (ref 3.5–5.2)
ALBUMIN/GLOBULIN RATIO: ABNORMAL (ref 1–2.5)
ALP BLD-CCNC: 76 U/L (ref 35–104)
ALT SERPL-CCNC: 22 U/L (ref 5–33)
AMMONIA: 45 UMOL/L (ref 11–51)
ANION GAP SERPL CALCULATED.3IONS-SCNC: 12 MMOL/L (ref 9–17)
AST SERPL-CCNC: 71 U/L
BASOPHILS # BLD: 0 % (ref 0–2)
BASOPHILS ABSOLUTE: 0 K/UL (ref 0–0.2)
BILIRUB SERPL-MCNC: 0.57 MG/DL (ref 0.3–1.2)
BUN BLDV-MCNC: 13 MG/DL (ref 8–23)
BUN/CREAT BLD: 22 (ref 9–20)
CALCIUM SERPL-MCNC: 9.5 MG/DL (ref 8.6–10.4)
CHLORIDE BLD-SCNC: 95 MMOL/L (ref 98–107)
CO2: 33 MMOL/L (ref 20–31)
CREAT SERPL-MCNC: 0.6 MG/DL (ref 0.5–0.9)
DIFFERENTIAL TYPE: ABNORMAL
EOSINOPHILS RELATIVE PERCENT: 1 % (ref 1–4)
GFR AFRICAN AMERICAN: >60 ML/MIN
GFR NON-AFRICAN AMERICAN: >60 ML/MIN
GFR SERPL CREATININE-BSD FRML MDRD: ABNORMAL ML/MIN/{1.73_M2}
GFR SERPL CREATININE-BSD FRML MDRD: ABNORMAL ML/MIN/{1.73_M2}
GLUCOSE BLD-MCNC: 85 MG/DL (ref 70–99)
HCT VFR BLD CALC: 34.9 % (ref 36–46)
HEMOGLOBIN: 10.8 G/DL (ref 12–16)
IMMATURE GRANULOCYTES: ABNORMAL %
LYMPHOCYTES # BLD: 16 % (ref 24–44)
MCH RBC QN AUTO: 24.3 PG (ref 26–34)
MCHC RBC AUTO-ENTMCNC: 30.9 G/DL (ref 31–37)
MCV RBC AUTO: 78.6 FL (ref 80–100)
MONOCYTES # BLD: 5 % (ref 1–7)
NRBC AUTOMATED: ABNORMAL PER 100 WBC
PDW BLD-RTO: 22 % (ref 11.5–14.5)
PLATELET # BLD: 157 K/UL (ref 130–400)
PLATELET ESTIMATE: ABNORMAL
PMV BLD AUTO: 11.2 FL (ref 6–12)
POTASSIUM SERPL-SCNC: 4.1 MMOL/L (ref 3.7–5.3)
RBC # BLD: 4.45 M/UL (ref 4–5.2)
RBC # BLD: ABNORMAL 10*6/UL
SEG NEUTROPHILS: 78 % (ref 36–66)
SEGMENTED NEUTROPHILS ABSOLUTE COUNT: 5.8 K/UL (ref 1.8–7.7)
SODIUM BLD-SCNC: 140 MMOL/L (ref 135–144)
T3 FREE: 2.95 PG/ML (ref 2.02–4.43)
THYROXINE, FREE: 1.26 NG/DL (ref 0.93–1.7)
TOTAL PROTEIN: 8.2 G/DL (ref 6.4–8.3)
TSH SERPL DL<=0.05 MIU/L-ACNC: 1.31 MIU/L (ref 0.3–5)
VITAMIN B-12: 620 PG/ML (ref 232–1245)
WBC # BLD: 7.4 K/UL (ref 3.5–11)
WBC # BLD: ABNORMAL 10*3/UL

## 2018-04-13 PROCEDURE — 85025 COMPLETE CBC W/AUTO DIFF WBC: CPT

## 2018-04-13 PROCEDURE — 84443 ASSAY THYROID STIM HORMONE: CPT

## 2018-04-13 PROCEDURE — 87077 CULTURE AEROBIC IDENTIFY: CPT

## 2018-04-13 PROCEDURE — 82607 VITAMIN B-12: CPT

## 2018-04-13 PROCEDURE — 87088 URINE BACTERIA CULTURE: CPT

## 2018-04-13 PROCEDURE — 87086 URINE CULTURE/COLONY COUNT: CPT

## 2018-04-13 PROCEDURE — 84481 FREE ASSAY (FT-3): CPT

## 2018-04-13 PROCEDURE — 84439 ASSAY OF FREE THYROXINE: CPT

## 2018-04-13 PROCEDURE — 82140 ASSAY OF AMMONIA: CPT

## 2018-04-13 PROCEDURE — 80053 COMPREHEN METABOLIC PANEL: CPT

## 2018-04-13 PROCEDURE — 87186 SC STD MICRODIL/AGAR DIL: CPT

## 2018-04-17 LAB
CULTURE: ABNORMAL
CULTURE: ABNORMAL
Lab: ABNORMAL
ORGANISM: ABNORMAL
SPECIMEN DESCRIPTION: ABNORMAL
SPECIMEN DESCRIPTION: ABNORMAL
STATUS: ABNORMAL

## 2018-04-18 ENCOUNTER — HOSPITAL ENCOUNTER (OUTPATIENT)
Age: 63
Setting detail: SPECIMEN
Discharge: HOME | End: 2018-04-18
Payer: MEDICAID

## 2018-04-18 LAB — PLATELET # BLD: 157 K/UL (ref 130–400)

## 2018-04-18 PROCEDURE — 85049 AUTOMATED PLATELET COUNT: CPT

## 2018-04-19 ENCOUNTER — HOSPITAL ENCOUNTER (OUTPATIENT)
Age: 63
Setting detail: SPECIMEN
Discharge: HOME | End: 2018-04-19
Payer: MEDICAID

## 2018-04-20 ENCOUNTER — HOSPITAL ENCOUNTER (OUTPATIENT)
Age: 63
Setting detail: SPECIMEN
Discharge: HOME | End: 2018-04-20
Payer: MEDICAID

## 2018-04-20 PROCEDURE — 87086 URINE CULTURE/COLONY COUNT: CPT

## 2018-04-20 PROCEDURE — 87186 SC STD MICRODIL/AGAR DIL: CPT

## 2018-04-20 PROCEDURE — 87088 URINE BACTERIA CULTURE: CPT

## 2018-04-22 LAB
CULTURE: ABNORMAL
CULTURE: ABNORMAL
Lab: ABNORMAL
Lab: ABNORMAL
ORGANISM: ABNORMAL
SPECIMEN DESCRIPTION: ABNORMAL
SPECIMEN DESCRIPTION: ABNORMAL
STATUS: ABNORMAL

## 2018-04-30 ENCOUNTER — HOSPITAL ENCOUNTER (OUTPATIENT)
Age: 63
Setting detail: SPECIMEN
Discharge: HOME | End: 2018-04-30
Payer: MEDICAID

## 2018-04-30 LAB
ANION GAP SERPL CALCULATED.3IONS-SCNC: 15 MMOL/L (ref 9–17)
BUN BLDV-MCNC: 14 MG/DL (ref 8–23)
BUN/CREAT BLD: ABNORMAL (ref 9–20)
CALCIUM SERPL-MCNC: 8.9 MG/DL (ref 8.6–10.4)
CHLORIDE BLD-SCNC: 100 MMOL/L (ref 98–107)
CO2: 26 MMOL/L (ref 20–31)
CREAT SERPL-MCNC: 0.48 MG/DL (ref 0.5–0.9)
GFR AFRICAN AMERICAN: >60 ML/MIN
GFR NON-AFRICAN AMERICAN: >60 ML/MIN
GFR SERPL CREATININE-BSD FRML MDRD: ABNORMAL ML/MIN/{1.73_M2}
GFR SERPL CREATININE-BSD FRML MDRD: ABNORMAL ML/MIN/{1.73_M2}
GLUCOSE BLD-MCNC: 87 MG/DL (ref 70–99)
HCT VFR BLD CALC: 39.2 % (ref 36.3–47.1)
HEMOGLOBIN: 11.1 G/DL (ref 11.9–15.1)
IRON SATURATION: 16 % (ref 20–55)
IRON: 51 UG/DL (ref 37–145)
MCH RBC QN AUTO: 26.8 PG (ref 25.2–33.5)
MCHC RBC AUTO-ENTMCNC: 28.3 G/DL (ref 28.4–34.8)
MCV RBC AUTO: 94.7 FL (ref 82.6–102.9)
NRBC AUTOMATED: 0 PER 100 WBC
PDW BLD-RTO: 15.7 % (ref 11.8–14.4)
PLATELET # BLD: 289 K/UL (ref 138–453)
PMV BLD AUTO: 11.1 FL (ref 8.1–13.5)
POTASSIUM SERPL-SCNC: 4.5 MMOL/L (ref 3.7–5.3)
RBC # BLD: 4.14 M/UL (ref 3.95–5.11)
SODIUM BLD-SCNC: 141 MMOL/L (ref 135–144)
TOTAL IRON BINDING CAPACITY: 319 UG/DL (ref 250–450)
UNSATURATED IRON BINDING CAPACITY: 268 UG/DL (ref 112–347)
WBC # BLD: 6.5 K/UL (ref 3.5–11.3)

## 2018-04-30 PROCEDURE — 36415 COLL VENOUS BLD VENIPUNCTURE: CPT

## 2018-04-30 PROCEDURE — 80048 BASIC METABOLIC PNL TOTAL CA: CPT

## 2018-04-30 PROCEDURE — 85027 COMPLETE CBC AUTOMATED: CPT

## 2018-04-30 PROCEDURE — P9603 ONE-WAY ALLOW PRORATED MILES: HCPCS

## 2018-04-30 PROCEDURE — 83540 ASSAY OF IRON: CPT

## 2018-04-30 PROCEDURE — 83550 IRON BINDING TEST: CPT

## 2023-07-19 NOTE — PROGRESS NOTES
for input(s): PHOS in the last 72 hours. S. Glucose:  No results for input(s): POCGLU in the last 72 hours. Glycosylated hemoglobin A1C: No results for input(s): LABA1C in the last 72 hours. INR: No results for input(s): INR in the last 72 hours. Hepatic functions:   No results for input(s): ALKPHOS, ALT, AST, PROT, BILITOT, BILIDIR, LABALBU in the last 72 hours. Pancreatic functions:No results for input(s): LACTA, AMYLASE in the last 72 hours. S. Lactic Acid: No results for input(s): LACTA in the last 72 hours. Cardiac enzymes:No results for input(s): CKTOTAL, CKMB, CKMBINDEX, TROPONINI in the last 72 hours. BNP:No results for input(s): BNP in the last 72 hours. Lipid profile: No results for input(s): CHOL, TRIG, HDL, LDLCALC in the last 72 hours. Invalid input(s): LDL  Blood Gases: No results found for: PH, PCO2, PO2, HCO3, O2SAT  Thyroid functions:   Lab Results   Component Value Date    TSH 1.62 03/05/2018        Imaging/Diagonstics:      CT Chest 3/12/18  Impression:     1.  Bilateral airspace disease with areas of consolidation, right greater  than left.  This may represent infection or inflammatory process related to  aspiration. 2.  Small pleural effusions and dependent atelectasis. 3. [place] Lazier prominent mediastinal lymph nodes, which may be reactive in  etiology. 4.  Splenomegaly. 5.  Persistent left SVC.          ASSESSMENT     Principal Problem:    Acute respiratory failure with hypercapnia (HCC) on ventilator   Active Problems:    Cellulitis of leg, left Resolved     Metabolic Alkalosis secondary chronic respiratory failure    Bibasilar pneumonia - aspiration or HCAP improving     Tracheostomy present (HCC)    Chronic Atrial Fibrillation    Urinary tract infection with hematuria improving    Bipolar disorder (HCC)    Acute metabolic encephalopathy    Iron deficiency anemia    COPD  Resolved Problems:    Hyperglycemia        PLAN        · Try weaning as tolerated, Ativan 1 mg q4 prn, [Time Spent: ___ minutes] : I have spent [unfilled] minutes of time on the encounter. [>50% of the face to face encounter time was spent on counseling and/or coordination of care for ___] : Greater than 50% of the face to face encounter time was spent on counseling and/or coordination of care for [unfilled]